# Patient Record
Sex: MALE | Race: WHITE | NOT HISPANIC OR LATINO | Employment: FULL TIME | URBAN - METROPOLITAN AREA
[De-identification: names, ages, dates, MRNs, and addresses within clinical notes are randomized per-mention and may not be internally consistent; named-entity substitution may affect disease eponyms.]

---

## 2020-08-05 ENCOUNTER — HISTORICAL (OUTPATIENT)
Dept: LAB | Facility: HOSPITAL | Age: 35
End: 2020-08-05

## 2020-08-05 LAB
ALBUMIN SERPL-MCNC: 3.8 GM/DL (ref 3.5–5)
ALBUMIN/GLOB SERPL: 1 RATIO (ref 1.1–2)
ALP SERPL-CCNC: 122 UNIT/L (ref 40–150)
ALT SERPL-CCNC: 33 UNIT/L (ref 0–55)
AST SERPL-CCNC: 17 UNIT/L (ref 5–34)
BILIRUB SERPL-MCNC: 0.4 MG/DL
BILIRUBIN DIRECT+TOT PNL SERPL-MCNC: 0.2 MG/DL
BILIRUBIN DIRECT+TOT PNL SERPL-MCNC: 0.2 MG/DL (ref 0–0.5)
BUN SERPL-MCNC: 16 MG/DL (ref 8.9–20.6)
CALCIUM SERPL-MCNC: 9.1 MG/DL (ref 8.4–10.2)
CHLORIDE SERPL-SCNC: 107 MMOL/L (ref 98–107)
CHOLEST SERPL-MCNC: 168 MG/DL
CHOLEST/HDLC SERPL: 4 {RATIO} (ref 0–5)
CO2 SERPL-SCNC: 26 MEQ/L (ref 22–29)
CREAT SERPL-MCNC: 0.96 MG/DL (ref 0.73–1.18)
EST. AVERAGE GLUCOSE BLD GHB EST-MCNC: 114 MG/DL
GLOBULIN SER-MCNC: 3.7 GM/DL (ref 2.4–3.5)
GLUCOSE SERPL-MCNC: 116 MG/DL (ref 74–100)
HBA1C MFR BLD: 5.6 % (ref 4–6)
HDLC SERPL-MCNC: 48 MG/DL (ref 35–60)
LDLC SERPL CALC-MCNC: 103 MG/DL (ref 50–140)
POTASSIUM SERPL-SCNC: 4.5 MMOL/L (ref 3.5–5.1)
PROT SERPL-MCNC: 7.5 GM/DL (ref 6.4–8.3)
SODIUM SERPL-SCNC: 140 MMOL/L (ref 136–145)
TRIGL SERPL-MCNC: 86 MG/DL (ref 34–140)
TSH SERPL-ACNC: 0.51 UIU/ML (ref 0.35–4.94)
VLDLC SERPL CALC-MCNC: 17 MG/DL

## 2022-11-08 DIAGNOSIS — F41.9 ANXIETY: ICD-10-CM

## 2022-11-08 DIAGNOSIS — G47.00 INSOMNIA, UNSPECIFIED TYPE: Primary | ICD-10-CM

## 2022-11-08 RX ORDER — SERTRALINE HYDROCHLORIDE 50 MG/1
50 TABLET, FILM COATED ORAL DAILY
COMMUNITY
End: 2022-11-08 | Stop reason: SDUPTHER

## 2022-11-08 RX ORDER — SERTRALINE HYDROCHLORIDE 50 MG/1
50 TABLET, FILM COATED ORAL DAILY
Qty: 30 TABLET | Refills: 0 | Status: SHIPPED | OUTPATIENT
Start: 2022-11-08 | End: 2022-11-22 | Stop reason: SDUPTHER

## 2022-11-08 RX ORDER — QUETIAPINE FUMARATE 50 MG/1
50 TABLET, FILM COATED ORAL NIGHTLY
Qty: 30 TABLET | Refills: 0 | Status: SHIPPED | OUTPATIENT
Start: 2022-11-08 | End: 2023-10-19

## 2022-11-08 RX ORDER — QUETIAPINE FUMARATE 50 MG/1
50 TABLET, FILM COATED ORAL NIGHTLY
COMMUNITY
End: 2022-11-08 | Stop reason: SDUPTHER

## 2022-11-22 DIAGNOSIS — F41.9 ANXIETY: ICD-10-CM

## 2022-11-22 RX ORDER — SERTRALINE HYDROCHLORIDE 50 MG/1
50 TABLET, FILM COATED ORAL DAILY
Qty: 30 TABLET | Refills: 0 | Status: SHIPPED | OUTPATIENT
Start: 2022-11-22 | End: 2023-10-19

## 2023-04-25 ENCOUNTER — HOSPITAL ENCOUNTER (EMERGENCY)
Facility: HOSPITAL | Age: 38
Discharge: HOME | End: 2023-04-25
Attending: EMERGENCY MEDICINE
Payer: MEDICAID

## 2023-04-25 VITALS
RESPIRATION RATE: 18 BRPM | BODY MASS INDEX: 34.3 KG/M2 | HEART RATE: 91 BPM | OXYGEN SATURATION: 98 % | SYSTOLIC BLOOD PRESSURE: 151 MMHG | HEIGHT: 71 IN | DIASTOLIC BLOOD PRESSURE: 86 MMHG | WEIGHT: 245 LBS | TEMPERATURE: 99 F

## 2023-04-25 DIAGNOSIS — S30.21XA CONTUSION OF PENIS, INITIAL ENCOUNTER: Primary | ICD-10-CM

## 2023-04-25 PROCEDURE — 99282 EMERGENCY DEPT VISIT SF MDM: CPT

## 2023-04-25 NOTE — ED PROVIDER NOTES
Encounter Date: 4/25/2023       History     Chief Complaint   Patient presents with    Groin Swelling     Penis swelling after getting caught in zipper. Penis is purple now.      Patient is a 37-year-old male presenting with complaint of bruise to his penis.  Patient states earlier today he caught his penis in his zipper and started having bruising to the glans penis.  Patient denies any other complaints.    Review of patient's allergies indicates:  No Known Allergies  Past Medical History:   Diagnosis Date    Anxiety     Cervical spondylosis     Mood disorder     Panic disorder (episodic paroxysmal anxiety)     Reactive hypoglycemia     Rectal pain     Spinal stenosis      Past Surgical History:   Procedure Laterality Date    CYST REMOVAL      eyebrow     Family History   Problem Relation Age of Onset    Leukemia Father      Social History     Tobacco Use    Smoking status: Never    Smokeless tobacco: Never   Substance Use Topics    Alcohol use: Yes    Drug use: Never     Review of Systems   Constitutional: Negative.    Respiratory: Negative.     Cardiovascular: Negative.    Gastrointestinal: Negative.    Genitourinary:  Positive for penile pain. Negative for difficulty urinating, dysuria, flank pain, frequency, genital sores, penile discharge, penile swelling, scrotal swelling and testicular pain.   Musculoskeletal: Negative.    Skin:  Positive for wound.   Neurological: Negative.      Physical Exam     Initial Vitals [04/25/23 1704]   BP Pulse Resp Temp SpO2   (!) 151/86 91 18 98.9 °F (37.2 °C) 98 %      MAP       --         Physical Exam    Nursing note and vitals reviewed.  Constitutional: He appears well-developed and well-nourished.   Cardiovascular:  Normal rate, regular rhythm and normal heart sounds.           Pulmonary/Chest: Breath sounds normal. He has no wheezes. He has no rhonchi.   Genitourinary:    Genitourinary Comments: Patient has small bruise about the size of a dime to the glans penis.  No  abrasion or laceration.  No swelling.  The area is mildly tender to palpation.       Neurological: He is alert and oriented to person, place, and time.   Skin:   No cellulitic changes.   Psychiatric: He has a normal mood and affect. His behavior is normal. Thought content normal.       ED Course   Procedures  Labs Reviewed - No data to display       Imaging Results    None          Medications - No data to display  Medical Decision Making:   Initial Assessment:   As per HPI  Differential Diagnosis:   Contusion, abrasion  ED Management:  Patient has a mild contusion to the penis, will discharge from the ER                        Clinical Impression:   Final diagnoses:  [S30.21XA] Contusion of penis, initial encounter (Primary)        ED Disposition Condition    Discharge Stable          ED Prescriptions    None       Follow-up Information    None          Rafael Phan MD  04/25/23 6815

## 2023-07-30 ENCOUNTER — HOSPITAL ENCOUNTER (EMERGENCY)
Facility: HOSPITAL | Age: 38
Discharge: HOME | End: 2023-07-30
Attending: PHYSICAL MEDICINE & REHABILITATION
Payer: MEDICAID

## 2023-07-30 VITALS
WEIGHT: 265 LBS | RESPIRATION RATE: 19 BRPM | DIASTOLIC BLOOD PRESSURE: 87 MMHG | BODY MASS INDEX: 37.1 KG/M2 | HEART RATE: 87 BPM | HEIGHT: 71 IN | OXYGEN SATURATION: 95 % | SYSTOLIC BLOOD PRESSURE: 127 MMHG | TEMPERATURE: 99 F

## 2023-07-30 DIAGNOSIS — R53.1 WEAKNESS: ICD-10-CM

## 2023-07-30 DIAGNOSIS — R04.0 EPISTAXIS: Primary | ICD-10-CM

## 2023-07-30 LAB
ALBUMIN SERPL-MCNC: 4 G/DL (ref 3.5–5)
ALBUMIN/GLOB SERPL: 1.1 RATIO (ref 1.1–2)
ALP SERPL-CCNC: 121 UNIT/L (ref 40–150)
ALT SERPL-CCNC: 21 UNIT/L (ref 0–55)
AST SERPL-CCNC: 14 UNIT/L (ref 5–34)
BASOPHILS # BLD AUTO: 0.03 X10(3)/MCL
BASOPHILS NFR BLD AUTO: 0.2 %
BILIRUBIN DIRECT+TOT PNL SERPL-MCNC: 0.2 MG/DL
BUN SERPL-MCNC: 11 MG/DL (ref 8.9–20.6)
CALCIUM SERPL-MCNC: 9 MG/DL (ref 8.4–10.2)
CHLORIDE SERPL-SCNC: 107 MMOL/L (ref 98–107)
CO2 SERPL-SCNC: 21 MMOL/L (ref 22–29)
CREAT SERPL-MCNC: 1.2 MG/DL (ref 0.73–1.18)
EOSINOPHIL # BLD AUTO: 0.02 X10(3)/MCL (ref 0–0.9)
EOSINOPHIL NFR BLD AUTO: 0.1 %
ERYTHROCYTE [DISTWIDTH] IN BLOOD BY AUTOMATED COUNT: 12.8 % (ref 11.5–17)
GFR SERPLBLD CREATININE-BSD FMLA CKD-EPI: >60 MLS/MIN/1.73/M2
GLOBULIN SER-MCNC: 3.6 GM/DL (ref 2.4–3.5)
GLUCOSE SERPL-MCNC: 166 MG/DL (ref 74–100)
HCT VFR BLD AUTO: 44.5 % (ref 42–52)
HGB BLD-MCNC: 15.1 G/DL (ref 14–18)
IMM GRANULOCYTES # BLD AUTO: 0.08 X10(3)/MCL (ref 0–0.04)
IMM GRANULOCYTES NFR BLD AUTO: 0.6 %
INR PPP: 1
LYMPHOCYTES # BLD AUTO: 1.25 X10(3)/MCL (ref 0.6–4.6)
LYMPHOCYTES NFR BLD AUTO: 8.7 %
MCH RBC QN AUTO: 29.5 PG (ref 27–31)
MCHC RBC AUTO-ENTMCNC: 33.9 G/DL (ref 33–36)
MCV RBC AUTO: 86.9 FL (ref 80–94)
MONOCYTES # BLD AUTO: 0.52 X10(3)/MCL (ref 0.1–1.3)
MONOCYTES NFR BLD AUTO: 3.6 %
NEUTROPHILS # BLD AUTO: 12.52 X10(3)/MCL (ref 2.1–9.2)
NEUTROPHILS NFR BLD AUTO: 86.8 %
NRBC BLD AUTO-RTO: 0 %
PLATELET # BLD AUTO: 298 X10(3)/MCL (ref 130–400)
PMV BLD AUTO: 10.4 FL (ref 7.4–10.4)
POTASSIUM SERPL-SCNC: 4.4 MMOL/L (ref 3.5–5.1)
PROT SERPL-MCNC: 7.6 GM/DL (ref 6.4–8.3)
PROTHROMBIN TIME: 9.9 SECONDS (ref 9.1–10.9)
RBC # BLD AUTO: 5.12 X10(6)/MCL (ref 4.7–6.1)
SODIUM SERPL-SCNC: 138 MMOL/L (ref 136–145)
WBC # SPEC AUTO: 14.42 X10(3)/MCL (ref 4.5–11.5)

## 2023-07-30 PROCEDURE — 80053 COMPREHEN METABOLIC PANEL: CPT | Performed by: PHYSICAL MEDICINE & REHABILITATION

## 2023-07-30 PROCEDURE — 85025 COMPLETE CBC W/AUTO DIFF WBC: CPT | Performed by: PHYSICAL MEDICINE & REHABILITATION

## 2023-07-30 PROCEDURE — 99285 EMERGENCY DEPT VISIT HI MDM: CPT | Mod: 25

## 2023-07-30 PROCEDURE — 85610 PROTHROMBIN TIME: CPT | Performed by: PHYSICAL MEDICINE & REHABILITATION

## 2023-07-30 PROCEDURE — 93005 ELECTROCARDIOGRAM TRACING: CPT

## 2023-07-30 NOTE — ED NOTES
"Pt to Ed with c/o R sided HA, R sided facial numbness,  pt heard a "pop" which a nose bleed followed. Denies injury. Reports recent weakness over past month with no sex drive. Arm strength, no deficits Sergio. Aaox4, gcs15  "

## 2023-07-31 NOTE — ED PROVIDER NOTES
"Encounter Date: 7/30/2023       History     Chief Complaint   Patient presents with    Headache    Epistaxis    Numbness    Dizziness    Weakness     Reports feeling a "pop" in the middle of forehead, then nose started bleeding, right sided facial numbness, weak and dizzy 2 hours PTA.       Chief Complaint  Patient presents with  · Headache  · Epistaxis  · Numbness  · Dizziness  · Weakness    Reports feeling a "pop" in the middle of forehead, then nose started bleeding, right sided facial numbness, weak and dizzy 2 hours PTA.  Patient states he was cooking and felt a pop in the frontal sinus region of the right side has felt a weird sensation in the sinus region of the right side of his face and did have a nosebleed which has resolved patient denies any trauma was cooking over a hot stove but was able to eat and has no other focal complaints      The history is provided by the patient.     Review of patient's allergies indicates:  No Known Allergies  Past Medical History:   Diagnosis Date    Anxiety     Cervical spondylosis     Mood disorder     Panic disorder (episodic paroxysmal anxiety)     Reactive hypoglycemia     Rectal pain     Spinal stenosis      Past Surgical History:   Procedure Laterality Date    CYST REMOVAL      eyebrow     Family History   Problem Relation Age of Onset    Leukemia Father      Social History     Tobacco Use    Smoking status: Never    Smokeless tobacco: Never   Substance Use Topics    Alcohol use: Yes    Drug use: Never     Review of Systems   Constitutional:  Negative for diaphoresis, fatigue and fever.   HENT:  Positive for sinus pressure. Negative for facial swelling.    Respiratory:  Negative for chest tightness.    Cardiovascular:  Negative for chest pain.   Neurological:  Positive for numbness (subjective mild right sided). Negative for facial asymmetry, weakness, light-headedness and headaches.   All other systems reviewed and are negative.      Physical Exam     Initial Vitals " [07/30/23 1738]   BP Pulse Resp Temp SpO2   (!) 152/98 97 20 98.8 °F (37.1 °C) 96 %      MAP       --         Physical Exam    Nursing note and vitals reviewed.  Constitutional: He appears well-developed and well-nourished.   HENT:   Head: Normocephalic and atraumatic.   Right Ear: External ear normal.   Left Ear: External ear normal.   Eyes: EOM are normal. Pupils are equal, round, and reactive to light.   Neck: Neck supple.   Normal range of motion.  Cardiovascular:  Normal rate, regular rhythm, normal heart sounds and intact distal pulses.           Pulmonary/Chest: Breath sounds normal.   Abdominal: Abdomen is soft. Bowel sounds are normal.   Musculoskeletal:         General: Normal range of motion.      Cervical back: Normal range of motion and neck supple.     Neurological: He is alert and oriented to person, place, and time. He has normal strength. A sensory deficit (Very mild subjective numbness at the right frontal sinus region and the right cheek states the area is slightly numb to touch there is no focal deficits there was no asymmetry noted) is present. No cranial nerve deficit. GCS score is 15. GCS eye subscore is 4. GCS verbal subscore is 5. GCS motor subscore is 6.   Skin: Skin is warm and dry.   Psychiatric: He has a normal mood and affect.         ED Course   Procedures  Labs Reviewed   COMPREHENSIVE METABOLIC PANEL - Abnormal; Notable for the following components:       Result Value    Carbon Dioxide 21 (*)     Glucose Level 166 (*)     Creatinine 1.20 (*)     Globulin 3.6 (*)     All other components within normal limits   CBC WITH DIFFERENTIAL - Abnormal; Notable for the following components:    WBC 14.42 (*)     Neut # 12.52 (*)     IG# 0.08 (*)     All other components within normal limits   PROTIME-INR - Normal   CBC W/ AUTO DIFFERENTIAL    Narrative:     The following orders were created for panel order CBC auto differential.  Procedure                               Abnormality         Status                      ---------                               -----------         ------                     CBC with Differential[[id number]]        Abnormal            Final result                 Please view results for these tests on the individual orders.     EKG Readings: (Independently Interpreted)   Initial Reading: No STEMI. Rhythm: Normal Sinus Rhythm. Heart Rate: 100. Ectopy: No Ectopy. Conduction: Normal. ST Segments: Normal ST Segments. T Waves: Normal. Clinical Impression: Normal Sinus Rhythm     ECG Results              EKG 12-lead (Final result)  Result time 07/30/23 19:44:20      Final result by Interface, Lab [facility] (07/30/23 19:44:20)                   Narrative:    Test Reason : R53.1,    Vent. Rate : 100 BPM     Atrial Rate : 100 BPM     P-R Int : 138 ms          QRS Dur : 088 ms      QT Int : 342 ms       P-R-T Axes : 048 027 016 degrees     QTc Int : 441 ms    Normal sinus rhythm  Normal ECG  No previous ECGs available  Confirmed by Ravindra Martinez MD ([id number]) on 7/30/2023 7:44:11 PM    Referred By: [email or web address]   SELF           Confirmed By:Ravindra Martinez MD                                  Imaging Results              CT Head Without Contrast (Preliminary result)  Result time 07/30/23 17:58:29      Preliminary result by Stephen De León MD (07/30/23 17:58:29)                   Narrative:    START OF REPORT:  Technique: CT of the head was performed without intravenous contrast with axial as well as coronal and sagittal images.    Comparison: None.    Dosage Information: Automated Exposure Control was utilized 829.99 mGy.cm.    Clinical history: Headache.    Findings:  CSF spaces: The ventricles sulci and basal cisterns are within normal limits.  Brain parenchyma: There is preservation of the grey white junction throughout. Subtle microvascular change is seen in portions of the periventricular and deep white matter tracts.  Cerebellum: Unremarkable.  Vascular: Unremarkable venous sinuses.  Sella and  "skull base: The sella appears to be within normal limits for age.  Cerebellopontine angles: Within normal limits.  Herniation: None.  Intracranial calcifications: Incidental note is made of bilateral choroid plexus calcification. Incidental note is made of some pineal region calcification. Incidental note is made of some calcification of the falx.  Calvarium: No acute linear or depressed skull fracture is seen.    Maxillofacial Structures:  Paranasal sinuses: The visualized paranasal sinuses appear clear with no mucoperiosteal thickening or air fluid levels identified.  Orbits: The orbits appear unremarkable.  Zygomatic arches: The zygomatic arches are intact and unremarkable.  Temporal bones and mastoids: The temporal bones and mastoids appear unremarkable.  TMJ: The mandibular condyles appear normally placed with respect to the mandibular fossa.  Nasal Bones: The nasal septum is midline.      Impression:  1. No acute intracranial process identified. Details and findings as noted above.                                      X-Rays:   Independently Interpreted Readings:   Other Readings:  CT scan noted to be unremarkable    Medications - No data to display  Medical Decision Making:   Initial Assessment:   Chief Complaint  Patient presents with  · Headache  · Epistaxis  · Numbness  · Dizziness  · Weakness    Reports feeling a "pop" in the middle of forehead, then nose started bleeding, right sided facial numbness, weak and dizzy 2 hours PTA.  Patient states he was cooking and felt a pop in the frontal sinus region of the right side has felt a weird sensation in the sinus region of the right side of his face and did have a nosebleed which has resolved patient denies any trauma was cooking over a hot stove but was able to eat and has no other focal complaint.  Differential Diagnosis:   Epistaxis, sinusitis, sinus pressure, neurologic event  Clinical Tests:   Lab Tests: Ordered and Reviewed  Radiological Study: Ordered " and Reviewed  ED Management:  Reviewed lab work and CT scan with the patient re-examined the patient there was no focal deficits there is mild subjective numbness and tingling which may be related to sinus pressure the numbness does not involve in the mouth or teeth and there is no asymmetry epistaxis has resolved patient is hemodynamically stable normotensive neurologically intact discussed with the patient that we will discharge home with close follow up understands he may return at any time and patient is comfortable with this patient is hemodynamically stable and discharged comfortable                          Clinical Impression:   Final diagnoses:  [R04.0] Epistaxis (Primary)  [R53.1] Weakness        ED Disposition Condition    Discharge Stable          ED Prescriptions    None       Follow-up Information       Follow up With Specialties Details Why Contact Info    Polo Yuan DO Family Medicine   [address] [facility] [address]  [phone]               Brandon Draper DO  07/30/23 2004

## 2023-09-13 DIAGNOSIS — N50.819 PAIN IN TESTICLE, UNSPECIFIED LATERALITY: Primary | ICD-10-CM

## 2023-09-21 ENCOUNTER — OFFICE VISIT (OUTPATIENT)
Dept: UROLOGY | Facility: CLINIC | Age: 38
End: 2023-09-21
Payer: MEDICAID

## 2023-09-21 VITALS
RESPIRATION RATE: 20 BRPM | BODY MASS INDEX: 38.67 KG/M2 | TEMPERATURE: 98 F | DIASTOLIC BLOOD PRESSURE: 77 MMHG | SYSTOLIC BLOOD PRESSURE: 111 MMHG | OXYGEN SATURATION: 98 % | HEIGHT: 71 IN | HEART RATE: 80 BPM | WEIGHT: 276.25 LBS

## 2023-09-21 DIAGNOSIS — N50.811 TESTICULAR PAIN, RIGHT: ICD-10-CM

## 2023-09-21 DIAGNOSIS — N50.819 PAIN IN TESTICLE, UNSPECIFIED LATERALITY: ICD-10-CM

## 2023-09-21 DIAGNOSIS — N40.1 BENIGN PROSTATIC HYPERPLASIA WITH WEAK URINARY STREAM: ICD-10-CM

## 2023-09-21 DIAGNOSIS — R10.9 ABDOMINAL PAIN, UNSPECIFIED ABDOMINAL LOCATION: ICD-10-CM

## 2023-09-21 DIAGNOSIS — R39.12 BENIGN PROSTATIC HYPERPLASIA WITH WEAK URINARY STREAM: ICD-10-CM

## 2023-09-21 DIAGNOSIS — N28.89 RENAL MASS: Primary | ICD-10-CM

## 2023-09-21 LAB
BILIRUB SERPL-MCNC: NEGATIVE MG/DL
BLOOD URINE, POC: NEGATIVE
COLOR, POC UA: NORMAL
GLUCOSE UR QL STRIP: NEGATIVE
KETONES UR QL STRIP: NEGATIVE
LEUKOCYTE ESTERASE URINE, POC: NEGATIVE
NITRITE, POC UA: NEGATIVE
PH, POC UA: 6.5
PROTEIN, POC: NEGATIVE
SPECIFIC GRAVITY, POC UA: 1.03
UROBILINOGEN, POC UA: 0.2

## 2023-09-21 PROCEDURE — 99214 OFFICE O/P EST MOD 30 MIN: CPT | Mod: PBBFAC | Performed by: NURSE PRACTITIONER

## 2023-09-21 PROCEDURE — 1159F PR MEDICATION LIST DOCUMENTED IN MEDICAL RECORD: ICD-10-PCS | Mod: CPTII,,, | Performed by: NURSE PRACTITIONER

## 2023-09-21 PROCEDURE — 99204 PR OFFICE/OUTPT VISIT, NEW, LEVL IV, 45-59 MIN: ICD-10-PCS | Mod: S$PBB,,, | Performed by: NURSE PRACTITIONER

## 2023-09-21 PROCEDURE — 81001 URINALYSIS AUTO W/SCOPE: CPT | Mod: PBBFAC | Performed by: NURSE PRACTITIONER

## 2023-09-21 PROCEDURE — 1160F RVW MEDS BY RX/DR IN RCRD: CPT | Mod: CPTII,,, | Performed by: NURSE PRACTITIONER

## 2023-09-21 PROCEDURE — 1159F MED LIST DOCD IN RCRD: CPT | Mod: CPTII,,, | Performed by: NURSE PRACTITIONER

## 2023-09-21 PROCEDURE — 3078F PR MOST RECENT DIASTOLIC BLOOD PRESSURE < 80 MM HG: ICD-10-PCS | Mod: CPTII,,, | Performed by: NURSE PRACTITIONER

## 2023-09-21 PROCEDURE — 3074F PR MOST RECENT SYSTOLIC BLOOD PRESSURE < 130 MM HG: ICD-10-PCS | Mod: CPTII,,, | Performed by: NURSE PRACTITIONER

## 2023-09-21 PROCEDURE — 3078F DIAST BP <80 MM HG: CPT | Mod: CPTII,,, | Performed by: NURSE PRACTITIONER

## 2023-09-21 PROCEDURE — 3008F BODY MASS INDEX DOCD: CPT | Mod: CPTII,,, | Performed by: NURSE PRACTITIONER

## 2023-09-21 PROCEDURE — 3074F SYST BP LT 130 MM HG: CPT | Mod: CPTII,,, | Performed by: NURSE PRACTITIONER

## 2023-09-21 PROCEDURE — 1160F PR REVIEW ALL MEDS BY PRESCRIBER/CLIN PHARMACIST DOCUMENTED: ICD-10-PCS | Mod: CPTII,,, | Performed by: NURSE PRACTITIONER

## 2023-09-21 PROCEDURE — 99204 OFFICE O/P NEW MOD 45 MIN: CPT | Mod: S$PBB,,, | Performed by: NURSE PRACTITIONER

## 2023-09-21 PROCEDURE — 3008F PR BODY MASS INDEX (BMI) DOCUMENTED: ICD-10-PCS | Mod: CPTII,,, | Performed by: NURSE PRACTITIONER

## 2023-09-21 RX ORDER — TAMSULOSIN HYDROCHLORIDE 0.4 MG/1
0.4 CAPSULE ORAL DAILY
Qty: 30 CAPSULE | Refills: 11 | Status: SHIPPED | OUTPATIENT
Start: 2023-09-21 | End: 2024-01-03

## 2023-09-21 NOTE — PROGRESS NOTES
Chief Complaint: No chief complaint on file.      HPI:  Patient is a 37-year-old male complaining of right testicular pain.Today patient presents bilateral intermittent testicular pain, rectal pain intermittently, testicular pain with intercourse, and weak urine stream.  Patient currently being treated with antibiotic for chlamydia.  Patient denies any dysuria, urinary urgency, frequency, incontinence, retention, gross hematuria, nocturia. Patient admits to family history of colorectal cancer but none urologically.  Patient was evaluated 6-7 years ago emergency room for rectal pain and it was determined he had a mass on the right lower aspect of his rectum any failed to be evaluated by Colorectal physician and follow-up with testing.      Allergies:  Review of patient's allergies indicates:  No Known Allergies    Medications:  Current Outpatient Medications   Medication Sig Dispense Refill    QUEtiapine (SEROQUEL) 50 MG tablet Take 1 tablet (50 mg total) by mouth every evening. 30 tablet 0    sertraline (ZOLOFT) 50 MG tablet Take 1 tablet (50 mg total) by mouth once daily. 30 tablet 0     No current facility-administered medications for this visit.       Review of Systems:  General: No fever, chills, fatigability, or weight loss.  Skin: No rashes, itching, or changes in color or texture of skin.  Chest: Denies WINCHESTER, cyanosis, wheezing, cough, and sputum production.  Abdomen: Appetite fine. No weight loss. Denies diarrhea, abdominal pain, hematemesis, or blood in stool.  Musculoskeletal: No joint stiffness or swelling. Denies back pain.  : As above.  All other review of systems negative.    PMH:  Past Medical History:   Diagnosis Date    Anxiety     Cervical spondylosis     Mood disorder     Panic disorder (episodic paroxysmal anxiety)     Reactive hypoglycemia     Rectal pain     Spinal stenosis        PSH:  Past Surgical History:   Procedure Laterality Date    CYST REMOVAL      eyebrow       FamHx:  Family History    Problem Relation Age of Onset    Leukemia Father        SocHx:  Social History     Socioeconomic History    Marital status: Single   Tobacco Use    Smoking status: Never    Smokeless tobacco: Never   Substance and Sexual Activity    Alcohol use: Yes    Drug use: Never    Sexual activity: Yes       Physical Exam:  There were no vitals filed for this visit.  General: A&Ox3, no apparent distress, no deformities  Neck: No masses, normal thyroid  Lungs: CTA sola, no use of accessory muscles  Heart: RRR, no arrhythmias  Abdomen: Soft, NT, ND, no masses, no hernias, no hepatosplenomegaly  Lymphatic: Neck and groin nodes negative  Skin: The skin is warm and dry. No jaundice.  Ext: No c/c/e.    GUMale: Test desc sola, no abnormalities of epididymus. Penis circumcised, with normal penile and scrotal skin. Meatus normal. Normal rectal tone, no hemorrhoids. Prostate:  2-1/2 finger breadth wide, 1/2 fingerbreadth thick smooth, soft, nontender, no nodules, a right lower rectal masses palpated and uncomfortable with palpation. SV not palpable. Perineum and anus normal.      Urinalysis:  Color, UA Dark Yellow    Spec Grav UA 1.030    pH, UA 6.5    WBC, UA Negative    Nitrite, UA Negative    Protein, POC Negative    Glucose, UA Negative    Ketones, UA Negative    Urobilinogen, UA 0.2    Bilirubin, POC Negative    Blood, UA Negative               Specimen Collected: 09/21/23 15:10         Microscopic urinalysis negative for WBCs, RBCs, nitrites.        Impression:  1. Pain in testicle, unspecified laterality  - Ambulatory referral/consult to Urology  2. BPH  3. Rectal mass    Plan: Instructed patient referred to gastroenterology for colonoscopy.  Instructed patient to start tamsulosin 0.4 mg p.o. daily.  Instructed patient for further testing of a CT of the abdomen pelvis with and without contrast to evaluate rectal mass.  Instructed patient on timed voiding every 2-3 hours, double voiding, avoidance of bladder irritants such as  alcohol citrus foods & drinks, chocolates, caffeinated drinks, energy drinks, spicy.  RTC 6 weeks.

## 2023-09-21 NOTE — LETTER
September 21, 2023      [facility]  [address] [address]  Phone: [phone]       Patient: Jeffrey Coulter   YOB: 1985  Date of Visit: 09/21/2023    To Whom It May Concern:    Emmett Coulter  was at [facility] on 09/21/2023. The patient may return to work/school on 09/22/2023. If you have any questions or concerns, or if I can be of further assistance, please do not hesitate to contact me.    Sincerely,    Vandana Doss LPN

## 2023-09-21 NOTE — PROGRESS NOTES
Placed in room. Seen by Andres Samaniego NP. Spoke with patient. CT of abdomen and pelvis. Referred for colonoscopy.  RTC in 6 weeks.

## 2023-10-05 ENCOUNTER — HOSPITAL ENCOUNTER (OUTPATIENT)
Dept: RADIOLOGY | Facility: HOSPITAL | Age: 38
Discharge: HOME | End: 2023-10-05
Attending: NURSE PRACTITIONER
Payer: MEDICAID

## 2023-10-05 DIAGNOSIS — R39.12 BENIGN PROSTATIC HYPERPLASIA WITH WEAK URINARY STREAM: ICD-10-CM

## 2023-10-05 DIAGNOSIS — N40.1 BENIGN PROSTATIC HYPERPLASIA WITH WEAK URINARY STREAM: ICD-10-CM

## 2023-10-05 PROCEDURE — 74176 CT ABD & PELVIS W/O CONTRAST: CPT | Mod: TC

## 2023-10-10 ENCOUNTER — OFFICE VISIT (OUTPATIENT)
Dept: UROLOGY | Facility: CLINIC | Age: 38
End: 2023-10-10
Payer: MEDICAID

## 2023-10-10 VITALS
DIASTOLIC BLOOD PRESSURE: 66 MMHG | HEIGHT: 70 IN | BODY MASS INDEX: 40.09 KG/M2 | HEART RATE: 69 BPM | SYSTOLIC BLOOD PRESSURE: 118 MMHG | RESPIRATION RATE: 16 BRPM | TEMPERATURE: 98 F | WEIGHT: 280 LBS

## 2023-10-10 DIAGNOSIS — K92.2 GASTROINTESTINAL HEMORRHAGE, UNSPECIFIED GASTROINTESTINAL HEMORRHAGE TYPE: ICD-10-CM

## 2023-10-10 DIAGNOSIS — N52.9 ERECTILE DYSFUNCTION, UNSPECIFIED ERECTILE DYSFUNCTION TYPE: ICD-10-CM

## 2023-10-10 DIAGNOSIS — K29.51 GASTROINTESTINAL HEMORRHAGE ASSOCIATED WITH CHRONIC GASTRITIS: ICD-10-CM

## 2023-10-10 DIAGNOSIS — R53.83 FATIGUE, UNSPECIFIED TYPE: Primary | ICD-10-CM

## 2023-10-10 PROCEDURE — 99214 OFFICE O/P EST MOD 30 MIN: CPT | Mod: PBBFAC | Performed by: NURSE PRACTITIONER

## 2023-10-10 PROCEDURE — 3078F DIAST BP <80 MM HG: CPT | Mod: CPTII,,, | Performed by: NURSE PRACTITIONER

## 2023-10-10 PROCEDURE — 1160F RVW MEDS BY RX/DR IN RCRD: CPT | Mod: CPTII,,, | Performed by: NURSE PRACTITIONER

## 2023-10-10 PROCEDURE — 1159F PR MEDICATION LIST DOCUMENTED IN MEDICAL RECORD: ICD-10-PCS | Mod: CPTII,,, | Performed by: NURSE PRACTITIONER

## 2023-10-10 PROCEDURE — 3008F PR BODY MASS INDEX (BMI) DOCUMENTED: ICD-10-PCS | Mod: CPTII,,, | Performed by: NURSE PRACTITIONER

## 2023-10-10 PROCEDURE — 3074F SYST BP LT 130 MM HG: CPT | Mod: CPTII,,, | Performed by: NURSE PRACTITIONER

## 2023-10-10 PROCEDURE — 1159F MED LIST DOCD IN RCRD: CPT | Mod: CPTII,,, | Performed by: NURSE PRACTITIONER

## 2023-10-10 PROCEDURE — 3074F PR MOST RECENT SYSTOLIC BLOOD PRESSURE < 130 MM HG: ICD-10-PCS | Mod: CPTII,,, | Performed by: NURSE PRACTITIONER

## 2023-10-10 PROCEDURE — 99213 OFFICE O/P EST LOW 20 MIN: CPT | Mod: S$PBB,,, | Performed by: NURSE PRACTITIONER

## 2023-10-10 PROCEDURE — 3078F PR MOST RECENT DIASTOLIC BLOOD PRESSURE < 80 MM HG: ICD-10-PCS | Mod: CPTII,,, | Performed by: NURSE PRACTITIONER

## 2023-10-10 PROCEDURE — 1160F PR REVIEW ALL MEDS BY PRESCRIBER/CLIN PHARMACIST DOCUMENTED: ICD-10-PCS | Mod: CPTII,,, | Performed by: NURSE PRACTITIONER

## 2023-10-10 PROCEDURE — 3008F BODY MASS INDEX DOCD: CPT | Mod: CPTII,,, | Performed by: NURSE PRACTITIONER

## 2023-10-10 PROCEDURE — 99213 PR OFFICE/OUTPT VISIT, EST, LEVL III, 20-29 MIN: ICD-10-PCS | Mod: S$PBB,,, | Performed by: NURSE PRACTITIONER

## 2023-10-10 RX ORDER — ASPIRIN 325 MG
TABLET, DELAYED RELEASE (ENTERIC COATED) ORAL
COMMUNITY
Start: 2023-09-28

## 2023-10-10 RX ORDER — SILDENAFIL CITRATE 20 MG/1
TABLET ORAL
COMMUNITY
Start: 2023-07-31 | End: 2024-01-03

## 2023-10-10 NOTE — LETTER
October 10, 2023      [facility]  [address] [address]  Phone: [phone]       Patient: Jeffrey Coulter   YOB: 1985  Date of Visit: 10/10/2023    To Whom It May Concern:    Emmett Coulter  was at [facility] on 10/10/2023. The patient may return to work/school on 10/10/2023. If you have any questions or concerns, or if I can be of further assistance, please do not hesitate to contact me.    Sincerely,    Vandana Doss LPN

## 2023-10-10 NOTE — PROGRESS NOTES
Chief Complaint: No chief complaint on file.      HPI: Patient is a 37-year-old male complaining of right testicular pain.Today patient presents bilateral intermittent testicular pain, rectal pain intermittently, testicular pain with intercourse, and weak urine stream.  On patient's last visit he was started on tamsulosin 0.4 mg p.o. daily and also sent to GI for colon evaluation.  Today's visit patient presents with persistent fatigue, unable to maintain an erection unless taking Viagra, persistent dark stools and also mixed bloody stools.  Patient has not been contacted by GI lab as of yet.  Patient also complaining of a lot of GI upset and gastritis.  Instructed patient to start Prilosec 40 mg p.o. daily until seen by GI.  We will run labs of CBC, CMP, estradiol, prolactin, PTH, thyroid, testosterone, PSA RTC 3 months unless positive for hypogonadism.  We will discuss with patient plan once labs completed.    Allergies:  Review of patient's allergies indicates:  No Known Allergies    Medications:  Current Outpatient Medications   Medication Sig Dispense Refill    cholecalciferol, vitamin D3, 1,250 mcg (50,000 unit) capsule       QUEtiapine (SEROQUEL) 50 MG tablet Take 1 tablet (50 mg total) by mouth every evening. 30 tablet 0    sertraline (ZOLOFT) 50 MG tablet Take 1 tablet (50 mg total) by mouth once daily. 30 tablet 0    sildenafil (REVATIO) 20 mg Tab SMARTSI-5 Tablet(s) By Mouth As Directed      tamsulosin (FLOMAX) 0.4 mg Cap Take 1 capsule (0.4 mg total) by mouth once daily. (Patient not taking: Reported on 10/10/2023) 30 capsule 11     No current facility-administered medications for this visit.       Review of Systems:  General: No fever, chills, fatigability, or weight loss.  Skin: No rashes, itching, or changes in color or texture of skin.  Chest: Denies WINCHESTER, cyanosis, wheezing, cough, and sputum production.  Abdomen: Appetite fine. No weight loss. Denies diarrhea, abdominal pain, hematemesis, or  blood in stool.  Musculoskeletal: No joint stiffness or swelling. Denies back pain.  : As above.  All other review of systems negative.    PMH:  Past Medical History:   Diagnosis Date    Anxiety     Cervical spondylosis     Mood disorder     Panic disorder (episodic paroxysmal anxiety)     Reactive hypoglycemia     Rectal pain     Spinal stenosis        PSH:  Past Surgical History:   Procedure Laterality Date    CYST REMOVAL      eyebrow       FamHx:  Family History   Problem Relation Age of Onset    Leukemia Father     Cancer Mother        SocHx:  Social History     Socioeconomic History    Marital status: Single   Tobacco Use    Smoking status: Never     Passive exposure: Never    Smokeless tobacco: Never   Substance and Sexual Activity    Alcohol use: Not Currently    Drug use: Never    Sexual activity: Yes     Partners: Female     Birth control/protection: None       Physical Exam:  Vitals:    10/10/23 0759   BP: 118/66   Pulse: 69   Resp: 16   Temp: 98.2 °F (36.8 °C)     General: A&Ox3, no apparent distress, no deformities  Neck: No masses, normal thyroid  Lungs: CTA sola, no use of accessory muscles  Heart: RRR, no arrhythmias  Abdomen: Soft, NT, ND, no masses, no hernias, no hepatosplenomegaly  Lymphatic: Neck and groin nodes negative  Skin: The skin is warm and dry. No jaundice.  Ext: No c/c/e.          Impression:  Fatigue  GI bleed  Erectile dysfunction      Plan:  Instructed patient will re-referred for GI for GI bleed.  Instructed patient start Prilosec 40 mg p.o. daily until seen by GI.  Instructed patient to get labs as discussed above, and will notify patient of results and treatment plan.  Instructed patient continue tamsulosin 0.4 mg p.o. daily RTC 3 months for re-evaluation.

## 2023-10-10 NOTE — PROGRESS NOTES
As per Zach Samaniego NP  RTC 3 months  Lab now-directions given building [address]  Re-Referral to GI clinic- phone number provided to check on appointment.

## 2023-10-19 ENCOUNTER — OFFICE VISIT (OUTPATIENT)
Dept: GASTROENTEROLOGY | Facility: CLINIC | Age: 38
End: 2023-10-19
Payer: MEDICAID

## 2023-10-19 VITALS
SYSTOLIC BLOOD PRESSURE: 136 MMHG | HEART RATE: 85 BPM | TEMPERATURE: 98 F | DIASTOLIC BLOOD PRESSURE: 87 MMHG | HEIGHT: 61 IN | OXYGEN SATURATION: 96 % | BODY MASS INDEX: 52.56 KG/M2 | WEIGHT: 278.38 LBS

## 2023-10-19 DIAGNOSIS — K62.89 RECTAL PAIN: Primary | ICD-10-CM

## 2023-10-19 DIAGNOSIS — K21.9 GASTROESOPHAGEAL REFLUX DISEASE, UNSPECIFIED WHETHER ESOPHAGITIS PRESENT: ICD-10-CM

## 2023-10-19 DIAGNOSIS — K59.09 CHRONIC CONSTIPATION: ICD-10-CM

## 2023-10-19 PROCEDURE — 3075F PR MOST RECENT SYSTOLIC BLOOD PRESS GE 130-139MM HG: ICD-10-PCS | Mod: CPTII,,, | Performed by: NURSE PRACTITIONER

## 2023-10-19 PROCEDURE — 1159F MED LIST DOCD IN RCRD: CPT | Mod: CPTII,,, | Performed by: NURSE PRACTITIONER

## 2023-10-19 PROCEDURE — 3079F DIAST BP 80-89 MM HG: CPT | Mod: CPTII,,, | Performed by: NURSE PRACTITIONER

## 2023-10-19 PROCEDURE — 1160F PR REVIEW ALL MEDS BY PRESCRIBER/CLIN PHARMACIST DOCUMENTED: ICD-10-PCS | Mod: CPTII,,, | Performed by: NURSE PRACTITIONER

## 2023-10-19 PROCEDURE — 1160F RVW MEDS BY RX/DR IN RCRD: CPT | Mod: CPTII,,, | Performed by: NURSE PRACTITIONER

## 2023-10-19 PROCEDURE — 99204 OFFICE O/P NEW MOD 45 MIN: CPT | Mod: S$PBB,,, | Performed by: NURSE PRACTITIONER

## 2023-10-19 PROCEDURE — 99214 OFFICE O/P EST MOD 30 MIN: CPT | Mod: PBBFAC | Performed by: NURSE PRACTITIONER

## 2023-10-19 PROCEDURE — 1159F PR MEDICATION LIST DOCUMENTED IN MEDICAL RECORD: ICD-10-PCS | Mod: CPTII,,, | Performed by: NURSE PRACTITIONER

## 2023-10-19 PROCEDURE — 3079F PR MOST RECENT DIASTOLIC BLOOD PRESSURE 80-89 MM HG: ICD-10-PCS | Mod: CPTII,,, | Performed by: NURSE PRACTITIONER

## 2023-10-19 PROCEDURE — 99204 PR OFFICE/OUTPT VISIT, NEW, LEVL IV, 45-59 MIN: ICD-10-PCS | Mod: S$PBB,,, | Performed by: NURSE PRACTITIONER

## 2023-10-19 PROCEDURE — 3008F PR BODY MASS INDEX (BMI) DOCUMENTED: ICD-10-PCS | Mod: CPTII,,, | Performed by: NURSE PRACTITIONER

## 2023-10-19 PROCEDURE — 3008F BODY MASS INDEX DOCD: CPT | Mod: CPTII,,, | Performed by: NURSE PRACTITIONER

## 2023-10-19 PROCEDURE — 3075F SYST BP GE 130 - 139MM HG: CPT | Mod: CPTII,,, | Performed by: NURSE PRACTITIONER

## 2023-10-19 RX ORDER — POLYETHYLENE GLYCOL 3350 17 G/17G
17 POWDER, FOR SOLUTION ORAL DAILY
Qty: 510 G | Refills: 5 | Status: SHIPPED | OUTPATIENT
Start: 2023-10-19 | End: 2023-10-19

## 2023-10-19 RX ORDER — POLYETHYLENE GLYCOL 3350, SODIUM SULFATE, SODIUM CHLORIDE, POTASSIUM CHLORIDE, SODIUM ASCORBATE, AND ASCORBIC ACID 7.5-2.691G
2000 KIT ORAL ONCE
Qty: 1 KIT | Refills: 0 | Status: SHIPPED | OUTPATIENT
Start: 2023-10-19 | End: 2023-10-19

## 2023-10-19 RX ORDER — POLYETHYLENE GLYCOL 3350 17 G/17G
17 POWDER, FOR SOLUTION ORAL DAILY
Qty: 510 G | Refills: 5 | Status: SHIPPED | OUTPATIENT
Start: 2023-10-19

## 2023-10-19 RX ORDER — PANTOPRAZOLE SODIUM 40 MG/1
40 TABLET, DELAYED RELEASE ORAL DAILY
Qty: 30 TABLET | Refills: 11 | Status: SHIPPED | OUTPATIENT
Start: 2023-10-19 | End: 2023-10-19

## 2023-10-19 RX ORDER — PANTOPRAZOLE SODIUM 40 MG/1
40 TABLET, DELAYED RELEASE ORAL DAILY
Qty: 30 TABLET | Refills: 11 | Status: SHIPPED | OUTPATIENT
Start: 2023-10-19 | End: 2024-03-28 | Stop reason: SDUPTHER

## 2023-10-19 NOTE — PROGRESS NOTES
"Subjective:       Patient ID: Jeffrey Coulter is a 37 y.o. male.    Chief Complaint: Rectal Pain (States he has been having pain in his rectum for quite some times. He states it is painful to have a BM and he saw a urologist and had his prostate checked but the Dr he saw said he felt something when he was pulling out from checking his prostate like a possible growth. States he feels pressure and cramping inside his rectum. ) and Gastroesophageal Reflux (States he has been having heartburn for a few weeks that comes and goes. Sometime he feels like he could spit fire. Denies any trouble swallowing. )    This 37-year-old  male with a history of anxiety and mood disorder is referred for rectal pain.  He presents unaccompanied.  He reports noticing a decreased sex drive over the past few months and difficulty maintaining an erection during intercourse.  He reports right-sided testicular pain noted with ejaculation.  He was started on Viagra shortly after onset of symptoms which has helped with his erection.  He had a testicular ultrasound done September 12, 2023 with findings of small bilateral hydroceles, left greater than right and no varicocele.  He was recommended to wear boxer briefs.  He saw Urology September 21, 2023 and recommended to start tamsulosin which he did not start.  He reports being able to initiate a stream but having difficulty maintaining a stream during urination.  He notices dribbling at the end of urination.  He describes pain in his rectum "higher up" that has been intermittent and fluctuating in intensity over the past 7 years.  The symptoms initially started while taking a tire off of a truck.  The pain is described as sharp and crampy and will frequently awaken him in the middle of the night.  The pain will relieve for a short period of time with defecation before reoccurring.  Bowel habits for the most part are described as formed stools.  He does not always feel completely " evacuated.  He denies melena, hematochezia, fecal urgency, fecal incontinence, or pain with defecation.  His appetite is fair and his weight is stable.  He denies fever, chills, nausea, vomiting, hematemesis, odynophagia, dysphagia, early satiety, or abdominal pain.  He reports frequent pyrosis and regurgitation of acid into the back of his throat with subsequent burning for the past several months.  He takes Tums most days of the week with some relief noted.  He has tried OTC PPI medications and H2 blockers, as well as antacids with some relief noted.  He has not taken the medication consistently or been prescribed a PPI medication in the past.    CT scan abdomen and pelvis without contrast October 5, 2023 revealed no evidence of nephrolithiasis, hydronephrosis, or hydroureter is visualized.  There is grossly symmetric bilateral perinephric stranding which is nonspecific, possibly related to medical-renal disease.  Please correlate with patient's clinical and laboratory findings.  Mild-to-moderate retained fecal material and gas scattered within the colon is seen.  Please correlate for an element of constipation.  Additional findings and details as above.     He denies ever having an EGD or colonoscopy done.  He denies regular NSAID use or use of blood thinners.  He denies tobacco or alcohol use.  He denies illicit drug use.  He denies a family history of IBD, colon polyps, or colon cancer.    Review of patient's allergies indicates:  No Known Allergies    Past Medical History:   Diagnosis Date    Anxiety     Cervical spondylosis     Mood disorder     Panic disorder (episodic paroxysmal anxiety)     Reactive hypoglycemia     Rectal pain     Spinal stenosis      Past Surgical History:   Procedure Laterality Date    CYST REMOVAL      eyebrow     Family History:   family history includes Cancer in his mother; Leukemia in his father.    Social History:    reports that he has never smoked. He has never been exposed to  tobacco smoke. He has never used smokeless tobacco. He reports that he does not currently use alcohol. He reports that he does not use drugs.    Review of Systems  Negative except as noted in the HPI.      Objective:      Physical Exam  Constitutional:       Appearance: Normal appearance.   HENT:      Head: Normocephalic.      Mouth/Throat:      Mouth: Mucous membranes are moist.   Eyes:      Extraocular Movements: Extraocular movements intact.      Conjunctiva/sclera: Conjunctivae normal.      Pupils: Pupils are equal, round, and reactive to light.   Cardiovascular:      Rate and Rhythm: Normal rate and regular rhythm.      Pulses: Normal pulses.      Heart sounds: Normal heart sounds.   Pulmonary:      Effort: Pulmonary effort is normal.      Breath sounds: Normal breath sounds.   Abdominal:      General: Bowel sounds are normal.      Palpations: Abdomen is soft.   Genitourinary:     Comments: Rectal exam completed and chaperoned, no external hemorrhoids or skin tags noted on exam, mild tenderness noted with MORENA, no masses, fissures, or internal hemorrhoids palpated, hard stool noted in rectal vault  Musculoskeletal:         General: Normal range of motion.      Cervical back: Normal range of motion and neck supple.   Skin:     General: Skin is warm and dry.   Neurological:      General: No focal deficit present.      Mental Status: He is alert and oriented to person, place, and time.   Psychiatric:         Mood and Affect: Mood normal.         Behavior: Behavior normal.         Thought Content: Thought content normal.         Judgment: Judgment normal.         Home Medications:     Current Outpatient Medications   Medication Sig    cholecalciferol, vitamin D3, 1,250 mcg (50,000 unit) capsule     QUEtiapine (SEROQUEL) 50 MG tablet Take 1 tablet (50 mg total) by mouth every evening.    sertraline (ZOLOFT) 50 MG tablet Take 1 tablet (50 mg total) by mouth once daily.    sildenafil (REVATIO) 20 mg Tab SMARTSI-5  Tablet(s) By Mouth As Directed    tamsulosin (FLOMAX) 0.4 mg Cap Take 1 capsule (0.4 mg total) by mouth once daily. (Patient not taking: Reported on 10/10/2023)     Laboratory Results:     Recent Results (from the past 2016 hour(s))   Comprehensive metabolic panel    Collection Time: 07/30/23  5:47 PM   Result Value Ref Range    Sodium Level 138 136 - 145 mmol/L    Potassium Level 4.4 3.5 - 5.1 mmol/L    Chloride 107 98 - 107 mmol/L    Carbon Dioxide 21 (L) 22 - 29 mmol/L    Glucose Level 166 (H) 74 - 100 mg/dL    Blood Urea Nitrogen 11.0 8.9 - 20.6 mg/dL    Creatinine 1.20 (H) 0.73 - 1.18 mg/dL    Calcium Level Total 9.0 8.4 - 10.2 mg/dL    Protein Total 7.6 6.4 - 8.3 gm/dL    Albumin Level 4.0 3.5 - 5.0 g/dL    Globulin 3.6 (H) 2.4 - 3.5 gm/dL    Albumin/Globulin Ratio 1.1 1.1 - 2.0 ratio    Bilirubin Total 0.2 <=1.5 mg/dL    Alkaline Phosphatase 121 40 - 150 unit/L    Alanine Aminotransferase 21 0 - 55 unit/L    Aspartate Aminotransferase 14 5 - 34 unit/L    eGFR >60 mls/min/1.73/m2   Protime-INR    Collection Time: 07/30/23  5:47 PM   Result Value Ref Range    PT 9.9 9.1 - 10.9 seconds    INR 1.0 <=1.3   CBC with Differential    Collection Time: 07/30/23  5:47 PM   Result Value Ref Range    WBC 14.42 (H) 4.50 - 11.50 x10(3)/mcL    RBC 5.12 4.70 - 6.10 x10(6)/mcL    Hgb 15.1 14.0 - 18.0 g/dL    Hct 44.5 42.0 - 52.0 %    MCV 86.9 80.0 - 94.0 fL    MCH 29.5 27.0 - 31.0 pg    MCHC 33.9 33.0 - 36.0 g/dL    RDW 12.8 11.5 - 17.0 %    Platelet 298 130 - 400 x10(3)/mcL    MPV 10.4 7.4 - 10.4 fL    Neut % 86.8 %    Lymph % 8.7 %    Mono % 3.6 %    Eos % 0.1 %    Basophil % 0.2 %    Lymph # 1.25 0.6 - 4.6 x10(3)/mcL    Neut # 12.52 (H) 2.1 - 9.2 x10(3)/mcL    Mono # 0.52 0.1 - 1.3 x10(3)/mcL    Eos # 0.02 0 - 0.9 x10(3)/mcL    Baso # 0.03 <=0.2 x10(3)/mcL    IG# 0.08 (H) 0 - 0.04 x10(3)/mcL    IG% 0.6 %    NRBC% 0.0 %   POCT URINE DIPSTICK WITH MICROSCOPE, AUTOMATED    Collection Time: 09/21/23  3:10 PM   Result Value Ref  Range    Color, UA Dark Yellow     Spec Grav UA 1.030     pH, UA 6.5     WBC, UA Negative     Nitrite, UA Negative     Protein, POC Negative     Glucose, UA Negative     Ketones, UA Negative     Urobilinogen, UA 0.2     Bilirubin, POC Negative     Blood, UA Negative    PSA, Total (Diagnostic)    Collection Time: 10/10/23  9:51 AM   Result Value Ref Range    Prostate Specific Antigen 1.32 <=4.00 ng/mL   Testosterone    Collection Time: 10/10/23  9:51 AM   Result Value Ref Range    Testosterone Total 398.14 240.24 - 870.68 ng/dL   Prolactin    Collection Time: 10/10/23  9:51 AM   Result Value Ref Range    Prolactin Level 7.09 3.46 - 19.40 ng/mL   Estradiol    Collection Time: 10/10/23  9:51 AM   Result Value Ref Range    Estradiol Level 42 pg/mL   TSH    Collection Time: 10/10/23  9:51 AM   Result Value Ref Range    TSH 0.565 0.350 - 4.940 uIU/mL     Imaging Results:     Narrative & Impression  EXAMINATION:  CT ABDOMEN PELVIS WITHOUT CONTRAST     CLINICAL HISTORY:  Flank pain, kidney stone suspected;Renal mass suspected (Ped 0-18y); Benign prostatic hyperplasia with lower urinary tract symptoms.     TECHNIQUE:  Axial CT slice through the abdomen and pelvis were obtained without the administration of intravenous contrast. Total DLP for the study is approximately 1748.6 mGy-cm. Automated exposure control was utilized.     COMPARISON:  CT abdomen pelvis dated 07/11/2016     FINDINGS:  The visualized lung bases appear to be grossly clear.  The visualized cardiac bases within normal limits for size.  The assessment of the intra-abdominal organs and bowel loops is limited in the absence of contrast.  The the noncontrast appearance of the liver, spleen, partially atrophic and fatty replaced pancreas, and adrenal glands appear to be within normal limits.  The gallbladder is somewhat contracted.  No evidence of calcified gallbladder calculi or pericholecystic fluid is apparent by CT.  No evidence of nephrolithiasis,  hydronephrosis, or hydroureter is appreciated.  There is grossly symmetric bilateral perinephric stranding which is nonspecific, possibly related to medical-renal disease.  The urinary bladder is underdistended which limits adequate assessment along with lack of contrast.     Portions of the small and large bowel are underdistended which along with lack of contrast limits assessment.  Mild-to-moderate retained fecal material and gas scattered within the colon is seen.  There is intramural fat attenuation within the ascending and proximal transverse colon which can be seen with chronic inflammatory processes.  The appendix appears to be normal in caliber.  No free air or free fluid is visualized.  The stomach is underdistended which limits assessment.  Scattered subcentimeter in short axis dimension mesenteric and retroperitoneal lymph nodes are noted.  Small fat containing umbilical hernia is seen.  Degenerative changes affect the visualized spine.  Chronic appearing bilateral L5 pars interarticularis defects are seen with minimal 2 mm anterolisthesis of L5 on S1.     Impression:     1. No evidence of nephrolithiasis, hydronephrosis, or hydroureter is visualized.  There is grossly symmetric bilateral perinephric stranding which is nonspecific, possibly related to medical-renal disease.  Please correlate with patient's clinical and laboratory findings.  2. Mild-to-moderate retained fecal material and gas scattered within the colon is seen.  Please correlate for an element of constipation.  3. Additional findings and details as above.      Electronically signed by: Soren Fox MD  Date:                                            10/05/2023  Time:                                           10:24    Assessment/Plan:     Problem List Items Addressed This Visit          GI    Rectal pain - Primary     Testicular ultrasound done September 12, 2023 with findings of small bilateral hydroceles, left greater than right and no  varicocele.  Followed by urology  CT scan abdomen and pelvis without contrast October 5, 2023 revealed no evidence of nephrolithiasis, hydronephrosis, or hydroureter is visualized.  There is grossly symmetric bilateral perinephric stranding which is nonspecific, possibly related to medical-renal disease.  Please correlate with patient's clinical and laboratory findings.  Mild-to-moderate retained fecal material and gas scattered within the colon is seen.  Please correlate for an element of constipation.  Additional findings and details as above.   Recommend soluble fiber supplementation  Avoid straining or sitting on the toilet for long periods of time  Recommend MiraLax 17 g daily  Colonoscopy   Call with updates   ER precautions provided   Follow-up clinic visit with NP in 3 months         Relevant Medications    polyethylene glycol (GLYCOLAX) 17 gram/dose powder    Other Relevant Orders    Case Request Endoscopy: COLONOSCOPY (Completed)    Chronic constipation     See above         Relevant Medications    polyethylene glycol (GLYCOLAX) 17 gram/dose powder    Other Relevant Orders    Case Request Endoscopy: COLONOSCOPY (Completed)    Gastroesophageal reflux disease     GERD lifestyle modifications  Reflux precautions  Avoid NSAID use  Avoid alcohol use   Start pantoprazole 40 mg daily  Will consider EGD with persistent or worsening symptoms despite PPI treatment         Relevant Medications    pantoprazole (PROTONIX) 40 MG tablet

## 2023-10-19 NOTE — ASSESSMENT & PLAN NOTE
GERD lifestyle modifications  Reflux precautions  Avoid NSAID use  Avoid alcohol use   Start pantoprazole 40 mg daily  Will consider EGD with persistent or worsening symptoms despite PPI treatment

## 2023-10-19 NOTE — LETTER
October 19, 2023      [facility]  [address] [facility] [address]  Phone: [phone]       Patient: Jeffrey Coulter   YOB: 1985  Date of Visit: 10/19/2023    To Whom It May Concern:    Emmett Coulter  was at [facility] on 10/19/2023. The patient may return to work/school on 10/20/2023 with no restrictions. If you have any questions or concerns, or if I can be of further assistance, please do not hesitate to contact me.    Sincerely,    Claudia Lucas LPN

## 2023-10-19 NOTE — ASSESSMENT & PLAN NOTE
Testicular ultrasound done September 12, 2023 with findings of small bilateral hydroceles, left greater than right and no varicocele.  Followed by urology  CT scan abdomen and pelvis without contrast October 5, 2023 revealed no evidence of nephrolithiasis, hydronephrosis, or hydroureter is visualized.  There is grossly symmetric bilateral perinephric stranding which is nonspecific, possibly related to medical-renal disease.  Please correlate with patient's clinical and laboratory findings.  Mild-to-moderate retained fecal material and gas scattered within the colon is seen.  Please correlate for an element of constipation.  Additional findings and details as above.   Recommend soluble fiber supplementation  Avoid straining or sitting on the toilet for long periods of time  Recommend MiraLax 17 g daily  Colonoscopy   Call with updates   ER precautions provided   Follow-up clinic visit with NP in 3 months

## 2023-10-26 ENCOUNTER — ANESTHESIA EVENT (OUTPATIENT)
Dept: ENDOSCOPY | Facility: HOSPITAL | Age: 38
End: 2023-10-26
Payer: MEDICAID

## 2023-10-26 NOTE — ANESTHESIA PREPROCEDURE EVALUATION
"                                                                                                             10/26/2023  Jeffrey Coulter is a 38 y.o., male. With PMHx of obesity, GERD, anxiety presents for COLONOSCOPY (Abdomen)    NO BETA BLOCKER USE    history of anxiety and mood disorder     Active Ambulatory Problems     Diagnosis Date Noted    Testicular pain, right 09/21/2023    Benign prostatic hyperplasia with weak urinary stream 09/21/2023    Gastrointestinal hemorrhage associated with chronic gastritis 10/10/2023    Rectal pain 10/19/2023    Chronic constipation 10/19/2023    Gastroesophageal reflux disease 10/19/2023     Resolved Ambulatory Problems     Diagnosis Date Noted    No Resolved Ambulatory Problems     Past Medical History:   Diagnosis Date    Anxiety     Cervical spondylosis     Mood disorder     Panic disorder (episodic paroxysmal anxiety)     Reactive hypoglycemia     Spinal stenosis        Past Surgical History:   Procedure Laterality Date    CYST REMOVAL      eyebrow                 Pre-op Assessment    I have reviewed the NPO Status.      Review of Systems  Anesthesia Hx:  No previous Anesthesia    Social:  Non-Smoker    Pulmonary:  Pulmonary Normal    Renal/:  Renal/ Normal     Hepatic/GI:   Bowel Prep. GERD, well controlled    Neurological:  Neurology Normal    Endocrine:  Obesity / BMI > 30  Psych:   anxiety        Vitals:    10/30/23 0923 10/30/23 0927   BP: (!) 151/95 (!) 151/95   BP Location: Left arm    Patient Position: Lying    Pulse: 67    Resp: (!) 22    Temp: 36.8 °C (98.2 °F)    TempSrc: Oral    SpO2: 97%    Weight: 124.7 kg (275 lb)    Height: 5' 11" (1.803 m)          Physical Exam  General: Alert, Cooperative and Well nourished    Airway:  Mallampati: II   Mouth Opening: Normal  TM Distance: Normal  Tongue: Normal  Neck ROM: Normal ROM    Dental:  Intact    Chest/Lungs:  Clear to auscultation, Normal Respiratory Rate    Heart:  Rate: Normal  Rhythm: " Regular Rhythm  Sounds: Normal      Lab Results   Component Value Date    WBC 14.42 (H) 07/30/2023    HGB 15.1 07/30/2023    HCT 44.5 07/30/2023    MCV 86.9 07/30/2023     07/30/2023       CMP  Sodium Level   Date Value Ref Range Status   07/30/2023 138 136 - 145 mmol/L Final     Potassium Level   Date Value Ref Range Status   07/30/2023 4.4 3.5 - 5.1 mmol/L Final     Carbon Dioxide   Date Value Ref Range Status   07/30/2023 21 (L) 22 - 29 mmol/L Final     Blood Urea Nitrogen   Date Value Ref Range Status   07/30/2023 11.0 8.9 - 20.6 mg/dL Final     Creatinine   Date Value Ref Range Status   07/30/2023 1.20 (H) 0.73 - 1.18 mg/dL Final     Calcium Level Total   Date Value Ref Range Status   07/30/2023 9.0 8.4 - 10.2 mg/dL Final     Albumin Level   Date Value Ref Range Status   07/30/2023 4.0 3.5 - 5.0 g/dL Final     Bilirubin Total   Date Value Ref Range Status   07/30/2023 0.2 <=1.5 mg/dL Final     Alkaline Phosphatase   Date Value Ref Range Status   07/30/2023 121 40 - 150 unit/L Final     Aspartate Aminotransferase   Date Value Ref Range Status   07/30/2023 14 5 - 34 unit/L Final     Alanine Aminotransferase   Date Value Ref Range Status   07/30/2023 21 0 - 55 unit/L Final     eGFR   Date Value Ref Range Status   07/30/2023 >60 mls/min/1.73/m2 Final           Anesthesia Plan  Type of Anesthesia, risks & benefits discussed:    Anesthesia Type: Gen Natural Airway  Intra-op Monitoring Plan: Standard ASA Monitors  Post Op Pain Control Plan: IV/PO Opioids PRN  Induction:  IV  Informed Consent: Informed consent signed with the Patient and all parties understand the risks and agree with anesthesia plan.  All questions answered. Patient consented to blood products? No  ASA Score: 2  Day of Surgery Review of History & Physical: H&P Update referred to the surgeon/provider.    Ready For Surgery From Anesthesia Perspective.     .

## 2023-10-30 ENCOUNTER — ANESTHESIA (OUTPATIENT)
Dept: ENDOSCOPY | Facility: HOSPITAL | Age: 38
End: 2023-10-30
Payer: MEDICAID

## 2023-10-30 ENCOUNTER — HOSPITAL ENCOUNTER (OUTPATIENT)
Facility: HOSPITAL | Age: 38
Discharge: HOME | End: 2023-10-30
Attending: INTERNAL MEDICINE | Admitting: INTERNAL MEDICINE
Payer: MEDICAID

## 2023-10-30 DIAGNOSIS — K59.09 CHRONIC CONSTIPATION: ICD-10-CM

## 2023-10-30 DIAGNOSIS — K62.89 RECTAL PAIN: ICD-10-CM

## 2023-10-30 LAB — POCT GLUCOSE: 92 MG/DL (ref 70–110)

## 2023-10-30 PROCEDURE — 63600175 PHARM REV CODE 636 W HCPCS: Performed by: SPECIALIST

## 2023-10-30 PROCEDURE — 25000003 PHARM REV CODE 250: Performed by: NURSE ANESTHETIST, CERTIFIED REGISTERED

## 2023-10-30 PROCEDURE — D9220A PRA ANESTHESIA: ICD-10-PCS | Mod: ,,, | Performed by: NURSE ANESTHETIST, CERTIFIED REGISTERED

## 2023-10-30 PROCEDURE — 45385 COLONOSCOPY W/LESION REMOVAL: CPT | Performed by: INTERNAL MEDICINE

## 2023-10-30 PROCEDURE — 27201423 OPTIME MED/SURG SUP & DEVICES STERILE SUPPLY: Performed by: INTERNAL MEDICINE

## 2023-10-30 PROCEDURE — 37000009 HC ANESTHESIA EA ADD 15 MINS: Performed by: INTERNAL MEDICINE

## 2023-10-30 PROCEDURE — 88305 TISSUE EXAM BY PATHOLOGIST: CPT | Mod: TC | Performed by: INTERNAL MEDICINE

## 2023-10-30 PROCEDURE — D9220A PRA ANESTHESIA: Mod: ,,, | Performed by: NURSE ANESTHETIST, CERTIFIED REGISTERED

## 2023-10-30 PROCEDURE — 37000008 HC ANESTHESIA 1ST 15 MINUTES: Performed by: INTERNAL MEDICINE

## 2023-10-30 PROCEDURE — 63600175 PHARM REV CODE 636 W HCPCS: Performed by: NURSE ANESTHETIST, CERTIFIED REGISTERED

## 2023-10-30 RX ORDER — LIDOCAINE HYDROCHLORIDE 20 MG/ML
INJECTION, SOLUTION EPIDURAL; INFILTRATION; INTRACAUDAL; PERINEURAL
Status: DISCONTINUED | OUTPATIENT
Start: 2023-10-30 | End: 2023-10-30

## 2023-10-30 RX ORDER — SODIUM CHLORIDE, SODIUM LACTATE, POTASSIUM CHLORIDE, CALCIUM CHLORIDE 600; 310; 30; 20 MG/100ML; MG/100ML; MG/100ML; MG/100ML
INJECTION, SOLUTION INTRAVENOUS CONTINUOUS
Status: ACTIVE | OUTPATIENT
Start: 2023-10-30

## 2023-10-30 RX ORDER — PROPOFOL 10 MG/ML
VIAL (ML) INTRAVENOUS
Status: DISCONTINUED | OUTPATIENT
Start: 2023-10-30 | End: 2023-10-30

## 2023-10-30 RX ADMIN — PROPOFOL 50 MG: 10 INJECTION, EMULSION INTRAVENOUS at 10:10

## 2023-10-30 RX ADMIN — PROPOFOL 120 MG: 10 INJECTION, EMULSION INTRAVENOUS at 10:10

## 2023-10-30 RX ADMIN — SODIUM CHLORIDE, POTASSIUM CHLORIDE, SODIUM LACTATE AND CALCIUM CHLORIDE: 600; 310; 30; 20 INJECTION, SOLUTION INTRAVENOUS at 09:10

## 2023-10-30 RX ADMIN — LIDOCAINE HYDROCHLORIDE 100 MG: 20 INJECTION, SOLUTION EPIDURAL; INFILTRATION; INTRACAUDAL; PERINEURAL at 10:10

## 2023-10-30 RX ADMIN — SODIUM CHLORIDE, POTASSIUM CHLORIDE, SODIUM LACTATE AND CALCIUM CHLORIDE: 600; 310; 30; 20 INJECTION, SOLUTION INTRAVENOUS at 10:10

## 2023-10-30 NOTE — TRANSFER OF CARE
"Anesthesia Transfer of Care Note    Patient: Jeffrey Coulter    Procedure(s) Performed: Procedure(s) (LRB):  COLONOSCOPY, WITH POLYPECTOMY USING SNARE (N/A)    Patient location: GI    Anesthesia Type: general    Post pain: adequate analgesia    Post assessment: no apparent anesthetic complications    Post vital signs: stable    Level of consciousness: awake    Nausea/Vomiting: no nausea/vomiting    Complications: none    Transfer of care protocol was followed      Last vitals:   Visit Vitals  BP (!) 151/95   Pulse 67   Temp 36.8 °C (98.2 °F) (Oral)   Resp (!) 22   Ht 5' 11" (1.803 m)   Wt 124.7 kg (275 lb)   SpO2 97%   BMI 38.35 kg/m²     "

## 2023-10-30 NOTE — ANESTHESIA POSTPROCEDURE EVALUATION
Anesthesia Post Evaluation    Patient: Jeffrey Coulter    Procedure(s) Performed: Procedure(s) (LRB):  COLONOSCOPY, WITH POLYPECTOMY USING SNARE (N/A)    Final Anesthesia Type: general      Patient location during evaluation: GI PACU  Patient participation: Yes- Able to Participate  Level of consciousness: awake and alert  Post-procedure vital signs: reviewed and stable  Pain management: adequate  Airway patency: patent    PONV status at discharge: No PONV  Anesthetic complications: no      Cardiovascular status: hemodynamically stable  Respiratory status: spontaneous ventilation  Hydration status: euvolemic  Follow-up not needed.          Vitals Value Taken Time   /95 10/30/23 0927   Temp 36.8 °C (98.2 °F) 10/30/23 0923   Pulse 67 10/30/23 0923   Resp 22 10/30/23 0923   SpO2 97 % 10/30/23 0923         No case tracking events are documented in the log.      Pain/Nallely Score: No data recorded

## 2023-10-30 NOTE — PROVATION PATIENT INSTRUCTIONS
Discharge Summary/Instructions after an Endoscopic Procedure  Patient Name: Jeffrey Coulter  Patient MRN: [id number]  Patient YOB: 1985  Monday, October 30, 2023  Bina Sparks MD  Dear patient,  As a result of recent federal legislation (The Federal Cures Act), you may   receive lab or pathology results from your procedure in your [facility]   account before your physician is able to contact you. Your physician or   their representative will relay the results to you with their   recommendations at their soonest availability.  Thank you,  RESTRICTIONS:  During your procedure today, you received medications for sedation.  These   medications may affect your judgment, balance and coordination.  Therefore,   for 24 hours, you have the following restrictions:   - DO NOT drive a car, operate machinery, make legal/financial decisions,   sign important papers or drink alcohol.    ACTIVITY:  Today: no heavy lifting, straining or running due to procedural   sedation/anesthesia.  The following day: return to full activity including work.  DIET:  Eat and drink normally unless instructed otherwise.     TREATMENT FOR COMMON SIDE EFFECTS:  - Mild abdominal pain, nausea, belching, bloating or excessive gas:  rest,   eat lightly and use a heating pad.  - Sore Throat: treat with throat lozenges and/or gargle with warm salt   water.  - Because air was used during the procedure, expelling large amounts of air   from your rectum or belching is normal.  - If a bowel prep was taken, you may not have a bowel movement for 1-3 days.    This is normal.  SYMPTOMS TO WATCH FOR AND REPORT TO YOUR PHYSICIAN:  1. Abdominal pain or bloating, other than gas cramps.  2. Chest pain.  3. Back pain.  4. Signs of infection such as: chills or fever occurring within 24 hours   after the procedure.  5. Rectal bleeding, which would show as bright red, maroon, or black stools.   (A tablespoon of blood from the rectum is not serious,  especially if   hemorrhoids are present.)  6. Vomiting.  7. Weakness or dizziness.  GO DIRECTLY TO THE NEAREST EMERGENCY ROOM IF YOU HAVE ANY OF THE FOLLOWING:      Difficulty breathing              Chills and/or fever over 101 F   Persistent vomiting and/or vomiting blood   Severe abdominal pain   Severe chest pain   Black, tarry stools   Bleeding- more than one tablespoon   Any other symptom or condition that you feel may need urgent attention  Your doctor recommends these additional instructions:  If any biopsies were taken, your doctors clinic will contact you in 1 to 2   weeks with any results.  - Patient has a contact number available for emergencies.  The signs and   symptoms of potential delayed complications were discussed with the   patient.  Return to normal activities tomorrow.  Written discharge   instructions were provided to the patient.   - Discharge patient to home.   - Resume previous diet.   - Continue present medications.   - Await pathology results.   - Repeat colonoscopy in 7 years for surveillance.   - Use Linzess (linaclotide) 145 mcg PO daily.  For questions, problems or results please call your physician - Bina Sparks MD at Work:  [phone], Work:  [phone].  [facility] , EMERGENCY ROOM PHONE NUMBER: [phone]  IF A COMPLICATION OR EMERGENCY SITUATION ARISES AND YOU ARE UNABLE TO REACH   YOUR PHYSICIAN - GO DIRECTLY TO THE EMERGENCY ROOM.  Bina Sparks MD  10/30/2023 11:07:43 AM  This report has been verified and signed electronically.  Dear patient,  As a result of recent federal legislation (The Federal Cures Act), you may   receive lab or pathology results from your procedure in your [facility]   account before your physician is able to contact you. Your physician or   their representative will relay the results to you with their   recommendations at their soonest availability.  Thank you,  PROVATION

## 2023-10-30 NOTE — H&P
"Colonoscopy History and Physical    Patient Name: Jeffrey Coulter  MRN: [id number]  : 1985  Date of Procedure:  10/30/2023  Referring Physician: Estephania Medina FNP  Primary Physician: Zach Pinzon NP  Procedure Physician: Bina Sparks MD, MPH     Procedure - Colonoscopy  ASA - per anesthesia  Mallampati - per anesthesia  History of Anesthesia problems - no  Family history Anesthesia problems -  no   Plan of anesthesia - General    Diagnosis: rectal pain  Chief Complaint: Same as above    HPI: Patient is an 38 y.o. male is here for the above.     Mr. Coulter is a 38 year old CM here for a colonoscopy for rectal pain.     He has had problems with erectile dysfunction and BPH, followed by urology.     He describes pain in his rectum "higher up" that has been intermittent and fluctuating in intensity over the past 7 years.  The symptoms initially started while taking a tire off of a truck.  The pain is described as sharp and crampy and will frequently awaken him in the middle of the night.  The pain will relieve for a short period of time with defecation before reoccurring.      He has had some problems with constipation lately over the past month.  He has gone a few days without a BM and also have hard stools requiring straining.   He does not always feel completely evacuated.  He has been using miralax for the past week without much improved.  He has seen bright red blood in the past which he associates with hemorrhoids.  His appetite is fair and his weight is stable.      He denies nausea, vomiting, hematemesis, odynophagia, dysphagia, early satiety, or abdominal pain.  He reports frequent pyrosis and regurgitation of acid into the back of his throat with subsequent burning for the past several months.  He takes Tums most days of the week with some relief noted.  He has tried OTC PPI medications and H2 blockers, as well as antacids with some relief noted.  He was placed on pantoprazole 40 mg daily " and symptoms have improved on this medication.      CT scan abdomen and pelvis without contrast October 5, 2023 revealed no evidence of nephrolithiasis, hydronephrosis, or hydroureter is visualized.  Mild-to-moderate retained fecal material and gas scattered within the colon is seen.  Please correlate for an element of constipation.      He denies ever having an EGD or colonoscopy done.  He denies regular NSAID use or use of blood thinners.  He denies tobacco or alcohol use.  He denies illicit drug use.  He denies a family history of IBD, colon polyps, or colon cancer.    Last colonoscopy: none  Family history: cousin colon cancer  Anticoagulation: none    ROS:  Constitutional: No fevers, chills, No weight loss  CV: No chest pain  Pulm: No cough, No shortness of breath  GI: see HPI    Medical History:   Past Medical History:   Diagnosis Date    Anxiety     Cervical spondylosis     Mood disorder     Panic disorder (episodic paroxysmal anxiety)     Reactive hypoglycemia     Rectal pain     Spinal stenosis          Surgical History:   Past Surgical History:   Procedure Laterality Date    CYST REMOVAL      eyebrow       Family History:   Family History   Problem Relation Age of Onset    Leukemia Father     Cancer Mother    .    Social History:   Social History     Socioeconomic History    Marital status: Single   Tobacco Use    Smoking status: Never     Passive exposure: Never    Smokeless tobacco: Never   Substance and Sexual Activity    Alcohol use: Not Currently     Comment: socially    Drug use: Never    Sexual activity: Yes     Partners: Female     Birth control/protection: None       Review of patient's allergies indicates:  No Known Allergies    Medications:   Medications Prior to Admission   Medication Sig Dispense Refill Last Dose    cholecalciferol, vitamin D3, 1,250 mcg (50,000 unit) capsule    Taking    pantoprazole (PROTONIX) 40 MG tablet Take 1 tablet (40 mg total) by mouth once daily. 30 tablet 11 Taking  "   sildenafil (REVATIO) 20 mg Tab SMARTSI-5 Tablet(s) By Mouth As Directed   Taking    polyethylene glycol (GLYCOLAX) 17 gram/dose powder Take 17 g by mouth once daily. 510 g 5     tamsulosin (FLOMAX) 0.4 mg Cap Take 1 capsule (0.4 mg total) by mouth once daily. 30 capsule 11          Physical Exam:    Vital Signs:   Vitals:    10/30/23 0927   BP: (!) 151/95   Pulse:    Resp:    Temp:      BP (!) 151/95   Pulse 67   Temp 98.2 °F (36.8 °C) (Oral)   Resp (!) 22   Ht 5' 11" (1.803 m)   Wt 124.7 kg (275 lb)   SpO2 97%   BMI 38.35 kg/m²     General:          Well appearing in no acute distress  Lungs: Clear to auscultation bilaterally, respirations unlabored  Heart: Regular rate and rhythm, S1 and S2 normal, no obvious murmurs  Abdomen:         Soft, non-tender, bowel sounds normal, no masses, no organomegaly        Labs:  Lab Results   Component Value Date    WBC 14.42 (H) 2023    HGB 15.1 2023    HCT 44.5 2023    MCV 86.9 2023     2023     Lab Results   Component Value Date    INR 1.0 2023     Lab Results   Component Value Date     2023    K 4.4 2023    CO2 21 (L) 2023    BUN 11.0 2023    CREATININE 1.20 (H) 2023    LABPROT 7.6 2023    ALBUMIN 4.0 2023    BILITOT 0.2 2023    ALKPHOS 121 2023    ALT 21 2023    AST 14 2023         Assessment and Plan:    History reviewed, vital signs satisfactory, cardiopulmonary status satisfactory.  I have explained the sedation options, risks, benefits, and alternatives of this endoscopic procedure to the patient including but not limited to bleeding, inflammation, infection, perforation, and death.  All questions were answered and the patient consented to proceed with procedure as planned.   The patient is deemed an appropriate candidate for the sedation as planned.      Bina Sparks MD, MPH   of Clinical Medicine  Gastroenterology and " Hepatology  [facility]    10/30/2023  9:48 AM

## 2023-10-31 ENCOUNTER — PATIENT MESSAGE (OUTPATIENT)
Dept: ADMINISTRATIVE | Facility: OTHER | Age: 38
End: 2023-10-31
Payer: MEDICAID

## 2023-10-31 VITALS
HEIGHT: 71 IN | DIASTOLIC BLOOD PRESSURE: 78 MMHG | BODY MASS INDEX: 38.5 KG/M2 | SYSTOLIC BLOOD PRESSURE: 142 MMHG | OXYGEN SATURATION: 99 % | TEMPERATURE: 98 F | HEART RATE: 61 BPM | RESPIRATION RATE: 20 BRPM | WEIGHT: 275 LBS

## 2023-11-02 LAB
ESTROGEN SERPL-MCNC: NORMAL PG/ML
INSULIN SERPL-ACNC: NORMAL U[IU]/ML
LAB AP CLINICAL INFORMATION: NORMAL
LAB AP GROSS DESCRIPTION: NORMAL
LAB AP REPORT FOOTNOTES: NORMAL
T3RU NFR SERPL: NORMAL %

## 2024-01-03 ENCOUNTER — HOSPITAL ENCOUNTER (EMERGENCY)
Facility: HOSPITAL | Age: 39
Discharge: HOME | End: 2024-01-03
Attending: FAMILY MEDICINE
Payer: MEDICAID

## 2024-01-03 VITALS
HEIGHT: 71 IN | WEIGHT: 278 LBS | BODY MASS INDEX: 38.92 KG/M2 | DIASTOLIC BLOOD PRESSURE: 89 MMHG | SYSTOLIC BLOOD PRESSURE: 121 MMHG | TEMPERATURE: 98 F | OXYGEN SATURATION: 98 % | HEART RATE: 84 BPM | RESPIRATION RATE: 18 BRPM

## 2024-01-03 DIAGNOSIS — R07.2 PRECORDIAL PAIN: Primary | ICD-10-CM

## 2024-01-03 DIAGNOSIS — R07.9 CHEST PAIN: ICD-10-CM

## 2024-01-03 LAB
ALBUMIN SERPL-MCNC: 4.2 G/DL (ref 3.5–5)
ALBUMIN/GLOB SERPL: 1.1 RATIO (ref 1.1–2)
ALP SERPL-CCNC: 121 UNIT/L (ref 40–150)
ALT SERPL-CCNC: 40 UNIT/L (ref 0–55)
AST SERPL-CCNC: 21 UNIT/L (ref 5–34)
BASOPHILS # BLD AUTO: 0.05 X10(3)/MCL
BASOPHILS NFR BLD AUTO: 0.3 %
BILIRUB SERPL-MCNC: 0.3 MG/DL
BNP BLD-MCNC: <10 PG/ML
BUN SERPL-MCNC: 14 MG/DL (ref 8.9–20.6)
CALCIUM SERPL-MCNC: 9.6 MG/DL (ref 8.4–10.2)
CHLORIDE SERPL-SCNC: 108 MMOL/L (ref 98–107)
CK MB SERPL-MCNC: <1 NG/ML
CK SERPL-CCNC: 73 U/L (ref 30–200)
CO2 SERPL-SCNC: 23 MMOL/L (ref 22–29)
CREAT SERPL-MCNC: 1.05 MG/DL (ref 0.73–1.18)
EOSINOPHIL # BLD AUTO: 0.04 X10(3)/MCL (ref 0–0.9)
EOSINOPHIL NFR BLD AUTO: 0.3 %
ERYTHROCYTE [DISTWIDTH] IN BLOOD BY AUTOMATED COUNT: 12.3 % (ref 11.5–17)
FLUAV AG UPPER RESP QL IA.RAPID: NOT DETECTED
FLUBV AG UPPER RESP QL IA.RAPID: NOT DETECTED
GFR SERPLBLD CREATININE-BSD FMLA CKD-EPI: >60 MLS/MIN/1.73/M2
GLOBULIN SER-MCNC: 3.9 GM/DL (ref 2.4–3.5)
GLUCOSE SERPL-MCNC: 135 MG/DL (ref 74–100)
HCT VFR BLD AUTO: 46.6 % (ref 42–52)
HGB BLD-MCNC: 15.7 G/DL (ref 14–18)
IMM GRANULOCYTES # BLD AUTO: 0.05 X10(3)/MCL (ref 0–0.04)
IMM GRANULOCYTES NFR BLD AUTO: 0.3 %
LYMPHOCYTES # BLD AUTO: 1.81 X10(3)/MCL (ref 0.6–4.6)
LYMPHOCYTES NFR BLD AUTO: 12.5 %
MCH RBC QN AUTO: 29.2 PG (ref 27–31)
MCHC RBC AUTO-ENTMCNC: 33.7 G/DL (ref 33–36)
MCV RBC AUTO: 86.8 FL (ref 80–94)
MONOCYTES # BLD AUTO: 0.87 X10(3)/MCL (ref 0.1–1.3)
MONOCYTES NFR BLD AUTO: 6 %
NEUTROPHILS # BLD AUTO: 11.67 X10(3)/MCL (ref 2.1–9.2)
NEUTROPHILS NFR BLD AUTO: 80.6 %
NRBC BLD AUTO-RTO: 0 %
PLATELET # BLD AUTO: 297 X10(3)/MCL (ref 130–400)
PMV BLD AUTO: 10.1 FL (ref 7.4–10.4)
POTASSIUM SERPL-SCNC: 3.9 MMOL/L (ref 3.5–5.1)
PROT SERPL-MCNC: 8.1 GM/DL (ref 6.4–8.3)
RBC # BLD AUTO: 5.37 X10(6)/MCL (ref 4.7–6.1)
SARS-COV-2 RNA RESP QL NAA+PROBE: NOT DETECTED
SODIUM SERPL-SCNC: 141 MMOL/L (ref 136–145)
TROPONIN I SERPL-MCNC: <0.01 NG/ML (ref 0–0.04)
TROPONIN I SERPL-MCNC: <0.01 NG/ML (ref 0–0.04)
WBC # SPEC AUTO: 14.49 X10(3)/MCL (ref 4.5–11.5)

## 2024-01-03 PROCEDURE — 93010 ELECTROCARDIOGRAM REPORT: CPT | Mod: ,,, | Performed by: INTERNAL MEDICINE

## 2024-01-03 PROCEDURE — 85025 COMPLETE CBC W/AUTO DIFF WBC: CPT | Performed by: FAMILY MEDICINE

## 2024-01-03 PROCEDURE — 83880 ASSAY OF NATRIURETIC PEPTIDE: CPT | Performed by: FAMILY MEDICINE

## 2024-01-03 PROCEDURE — 82550 ASSAY OF CK (CPK): CPT | Performed by: FAMILY MEDICINE

## 2024-01-03 PROCEDURE — 84484 ASSAY OF TROPONIN QUANT: CPT | Performed by: FAMILY MEDICINE

## 2024-01-03 PROCEDURE — 93005 ELECTROCARDIOGRAM TRACING: CPT

## 2024-01-03 PROCEDURE — 25000003 PHARM REV CODE 250: Performed by: FAMILY MEDICINE

## 2024-01-03 PROCEDURE — 0240U COVID/FLU A&B PCR: CPT | Performed by: FAMILY MEDICINE

## 2024-01-03 PROCEDURE — 82553 CREATINE MB FRACTION: CPT | Performed by: FAMILY MEDICINE

## 2024-01-03 PROCEDURE — 80053 COMPREHEN METABOLIC PANEL: CPT | Performed by: FAMILY MEDICINE

## 2024-01-03 PROCEDURE — 99285 EMERGENCY DEPT VISIT HI MDM: CPT | Mod: 25

## 2024-01-03 RX ORDER — MAG HYDROX/ALUMINUM HYD/SIMETH 200-200-20
30 SUSPENSION, ORAL (FINAL DOSE FORM) ORAL ONCE
Status: COMPLETED | OUTPATIENT
Start: 2024-01-03 | End: 2024-01-03

## 2024-01-03 RX ORDER — LIDOCAINE HYDROCHLORIDE 20 MG/ML
15 SOLUTION OROPHARYNGEAL ONCE
Status: COMPLETED | OUTPATIENT
Start: 2024-01-03 | End: 2024-01-03

## 2024-01-03 RX ORDER — ONDANSETRON 4 MG/1
4 TABLET, ORALLY DISINTEGRATING ORAL
Status: COMPLETED | OUTPATIENT
Start: 2024-01-03 | End: 2024-01-03

## 2024-01-03 RX ADMIN — ONDANSETRON 4 MG: 4 TABLET, ORALLY DISINTEGRATING ORAL at 09:01

## 2024-01-03 RX ADMIN — ALUMINUM HYDROXIDE, MAGNESIUM HYDROXIDE, AND DIMETHICONE 30 ML: 200; 20; 200 SUSPENSION ORAL at 09:01

## 2024-01-03 RX ADMIN — LIDOCAINE HYDROCHLORIDE 15 ML: 20 SOLUTION ORAL at 09:01

## 2024-01-03 NOTE — Clinical Note
"Jeffrey Coulter (Daniel) was seen and treated in our emergency department on 1/3/2024.  He may return to work on 01/05/2024.       If you have any questions or concerns, please don't hesitate to call.       RN    "

## 2024-01-04 NOTE — CONSULTS
[facility] - Emergency Dept  Cardiology  Consult Note    Patient Name: Jeffrey Coulter  MRN: [id number]  Admission Date: 1/3/2024  Hospital Length of Stay: 0 days  Code Status: No Order   Attending Provider: Alisa Austin *   Consulting Provider: Yevgeniy Rodriguez NP  Primary Care Physician: Zach Pinzon NP  Principal Problem:<principal problem not specified>    Patient information was obtained from patient and ER records.     Subjective:     Chief Complaint:  Chest pain, Nausea      HPI: This is a 38-year-old male unknown to [facility] presented to the ER with complaints of chest pain substernal with radiation to the left arm and jaw with associated nausea.  The pain started while at rest and continued while driving to the ER.  Once arriving to the ER the pain had decreased in intensity.  Denies any complaints of cardiac history and does have a significant past medical history of anxiety and panic disorders.  His EKG showed normal sinus rhythm with no acute ST or T wave abnormalities  Troponins were normal on the first set.  [facility] was consulted for cardiac evaluation regarding chest pain.    Past medical history: Anxiety, mood disorder, panic disorder, rectal pain, spinal stenosis  Past surgical history: Cyst removal, colonoscopy with polypectomy  Family history: Leukemia father  Cancer mother  Social history denies excessive EtOH, denies any illicit drug use, denies smoking           Review of patient's allergies indicates:  No Known Allergies    Current Facility-Administered Medications on File Prior to Encounter   Medication    lactated ringers infusion     Current Outpatient Medications on File Prior to Encounter   Medication Sig    cholecalciferol, vitamin D3, 1,250 mcg (50,000 unit) capsule     linaCLOtide (LINZESS) 145 mcg Cap capsule Take 1 capsule (145 mcg total) by mouth before breakfast.    pantoprazole (PROTONIX) 40 MG tablet Take 1 tablet (40 mg total) by mouth once daily.    polyethylene  glycol (GLYCOLAX) 17 gram/dose powder Take 17 g by mouth once daily.    [DISCONTINUED] sildenafil (REVATIO) 20 mg Tab SMARTSI-5 Tablet(s) By Mouth As Directed    [DISCONTINUED] tamsulosin (FLOMAX) 0.4 mg Cap Take 1 capsule (0.4 mg total) by mouth once daily.       Review of Systems:  Review of Systems   Constitutional: Negative.    HENT: Negative.     Eyes: Negative.    Respiratory:  Positive for shortness of breath.    Cardiovascular:  Positive for chest pain.   Gastrointestinal:  Positive for nausea.   Endocrine: Negative.    Genitourinary: Negative.    Musculoskeletal: Negative.    Skin: Negative.    Allergic/Immunologic: Negative.    Neurological:  Positive for light-headedness.   Hematological: Negative.        Objective:     Vital Signs (Most Recent):  Temp: 98.8 °F (37.1 °C) (24)  Pulse: 91 (24)  Resp: 19 (24)  BP: (!) 143/92 (24)  SpO2: 97 % (24) Vital Signs (24h Range):  Temp:  [98.8 °F (37.1 °C)] 98.8 °F (37.1 °C)  Pulse:  [88-91] 91  Resp:  [16-19] 19  SpO2:  [97 %] 97 %  BP: (143-150)/(92) 143/92     Weight: 126.1 kg (278 lb)  Body mass index is 38.77 kg/m².    SpO2: 97 %       No intake or output data in the 24 hours ending 24 2232    Lines/Drains/Airways       Peripheral Intravenous Line  Duration                  Peripheral IV - Single Lumen 24 18 G;1 1/4 in Left;Posterior Hand <1 day                      Significant Labs:   Recent Lab Results         24        Influenza A, Molecular Not Detected         Influenza B, Molecular Not Detected         Albumin/Globulin Ratio   1.1       Albumin   4.2       ALP   121       ALT   40       AST   21       Baso #   0.05       Basophil %   0.3       BILIRUBIN TOTAL   0.3       BNP   <10.0       BUN   14.0       Calcium   9.6       Chloride   108       CO2   23       CPK   73       CPK MB   <1.0       Creatinine   1.05       eGFR   >60       Eos #   0.04        Eosinophil %   0.3       Globulin, Total   3.9       Glucose   135       Hematocrit   46.6       Hemoglobin   15.7       Immature Grans (Abs)   0.05       Immature Granulocytes   0.3       Lymph #   1.81       LYMPH %   12.5       MCH   29.2       MCHC   33.7       MCV   86.8       Mono #   0.87       Mono %   6.0       MPV   10.1       Neut #   11.67       Neut %   80.6       nRBC   0.0       Platelet Count   297       Potassium   3.9       PROTEIN TOTAL   8.1       RBC   5.37       RDW   12.3       SARS-CoV2 (COVID-19) Qualitative PCR Not Detected         Sodium   141       Troponin I   <0.010       WBC   14.49                 Significant Imaging:   Imaging Results              X-Ray Chest AP Portable (Final result)  Result time 01/03/24 21:25:11      Final result by Lenin Whitaker MD (01/03/24 21:25:11)                   Impression:      No acute cardiopulmonary process.      Electronically signed by: Lenin Whitaker  Date:    01/03/2024  Time:    21:25               Narrative:    EXAMINATION:  XR CHEST AP PORTABLE    CLINICAL HISTORY:  Chest Pain;    TECHNIQUE:  Single view of the chest    COMPARISON:  01/26/2017    FINDINGS:  No focal opacification, pleural effusion, or pneumothorax.    The cardiomediastinal silhouette is within normal limits.    No acute osseous abnormality.                                        EKG:  No results found for this visit on 01/03/24.    Telemetry:  SR    Physical Exam:  Physical Exam  Constitutional:       Appearance: He is obese.   HENT:      Mouth/Throat:      Mouth: Mucous membranes are dry.   Cardiovascular:      Rate and Rhythm: Normal rate and regular rhythm.   Pulmonary:      Effort: Pulmonary effort is normal.      Breath sounds: Normal breath sounds.   Skin:     General: Skin is dry.   Neurological:      Mental Status: He is alert.   Psychiatric:         Mood and Affect: Mood normal.         Behavior: Behavior normal.         Home Medications:   Current  Facility-Administered Medications on File Prior to Encounter   Medication Dose Route Frequency Provider Last Rate Last Admin    lactated ringers infusion   Intravenous Continuous Tyesha Lynn MD 10 mL/hr at 10/30/23 0921 New Bag at 10/30/23 0921     Current Outpatient Medications on File Prior to Encounter   Medication Sig Dispense Refill    cholecalciferol, vitamin D3, 1,250 mcg (50,000 unit) capsule       linaCLOtide (LINZESS) 145 mcg Cap capsule Take 1 capsule (145 mcg total) by mouth before breakfast. 30 capsule 11    pantoprazole (PROTONIX) 40 MG tablet Take 1 tablet (40 mg total) by mouth once daily. 30 tablet 11    polyethylene glycol (GLYCOLAX) 17 gram/dose powder Take 17 g by mouth once daily. 510 g 5    [DISCONTINUED] sildenafil (REVATIO) 20 mg Tab SMARTSI-5 Tablet(s) By Mouth As Directed      [DISCONTINUED] tamsulosin (FLOMAX) 0.4 mg Cap Take 1 capsule (0.4 mg total) by mouth once daily. 30 capsule 11       Current Inpatient Medications:  No current facility-administered medications for this encounter.    Current Outpatient Medications:     cholecalciferol, vitamin D3, 1,250 mcg (50,000 unit) capsule, , Disp: , Rfl:     linaCLOtide (LINZESS) 145 mcg Cap capsule, Take 1 capsule (145 mcg total) by mouth before breakfast., Disp: 30 capsule, Rfl: 11    pantoprazole (PROTONIX) 40 MG tablet, Take 1 tablet (40 mg total) by mouth once daily., Disp: 30 tablet, Rfl: 11    polyethylene glycol (GLYCOLAX) 17 gram/dose powder, Take 17 g by mouth once daily., Disp: 510 g, Rfl: 5    Facility-Administered Medications Ordered in Other Encounters:     lactated ringers infusion, , Intravenous, Continuous, Tyesha Lynn MD, Last Rate: 10 mL/hr at 10/30/23 0921, New Bag at 10/30/23 0921           Assessment:     IMPRESSION:  Palpitations   -NSR on tele EKG  Chest pain   UCHE   -Moderately compliant with CPAP  Anxiety   GERD        PLAN:     PLAN:  Troponins and EKG have returned normal thus far  Pain is  reproducible with palpation  His symptoms are described and seemingly initiated with palpations however NSR is on EKG and Tele  Recommend obtaining 2nd set of troponins for rule out   If 2nd troponin returns normal he can be discharged form a cardiac stand point and follow up with [facility] in [place]   Recommend event monitor or MCT as outpatient to evaluate for arrhythmia   Please call should any acute changes occur.  Thank you for your consult.     Yevgeniy Rodriguez NP  Cardiology  [facility] - Emergency Dept  01/03/2024 10:32 PM

## 2024-01-04 NOTE — ED PROVIDER NOTES
Encounter Date: 1/3/2024       History     Chief Complaint   Patient presents with    Chest Pain     C/o CP and nausea starting 30-45 min ago that worsens with movement. Denies Vomiting, diarrhea, and SOB. States he had chills right before the CP started. AAOx4.      This patient is a 38-year-old male with no significant past medical history who states that he was laying down watching TV when he got sudden substernal chest pain that radiated to his left arm, making his left jaw numb and making him nauseous.  States that he was having the pain the whole time driving to [place] from [facility].  The pain eased up once he got to the ER but he still says it has a 1-2 and he still having pain in his left arm.  Patient denies any history of heart problems but he does have a history of anxiety    The history is provided by the patient.     Review of patient's allergies indicates:  No Known Allergies  Past Medical History:   Diagnosis Date    Anxiety     Cervical spondylosis     Mood disorder     Panic disorder (episodic paroxysmal anxiety)     Reactive hypoglycemia     Rectal pain     Spinal stenosis      Past Surgical History:   Procedure Laterality Date    COLONOSCOPY, WITH POLYPECTOMY USING SNARE N/A 10/30/2023    Procedure: COLONOSCOPY, WITH POLYPECTOMY USING SNARE;  Surgeon: Bina Sparks MD;  Location: [facility] ENDOSCOPY;  Service: Gastroenterology;  Laterality: N/A;    CYST REMOVAL      eyebrow     Family History   Problem Relation Age of Onset    Leukemia Father     Cancer Mother      Social History     Tobacco Use    Smoking status: Never     Passive exposure: Never    Smokeless tobacco: Never   Substance Use Topics    Alcohol use: Not Currently     Comment: socially    Drug use: Never     Review of Systems   Constitutional: Negative.    HENT: Negative.     Respiratory: Negative.     Cardiovascular:  Positive for chest pain.   Gastrointestinal:  Positive for nausea.   Endocrine: Negative.    Musculoskeletal:  Negative.    Skin: Negative.    Neurological: Negative.    Psychiatric/Behavioral: Negative.     All other systems reviewed and are negative.      Physical Exam     Initial Vitals [01/03/24 2054]   BP Pulse Resp Temp SpO2   (!) 150/92 88 16 98.8 °F (37.1 °C) 97 %      MAP       --         Physical Exam    Nursing note and vitals reviewed.  Constitutional: He appears well-developed and well-nourished.   HENT:   Head: Normocephalic.   Eyes: Pupils are equal, round, and reactive to light.   Neck:   Normal range of motion.  Cardiovascular:  Normal rate and regular rhythm.           Pulmonary/Chest: Breath sounds normal.   Abdominal: Abdomen is soft. Bowel sounds are normal.   Musculoskeletal:         General: Normal range of motion.      Cervical back: Normal range of motion.     Neurological: He is alert and oriented to person, place, and time. GCS score is 15. GCS eye subscore is 4. GCS verbal subscore is 5. GCS motor subscore is 6.   Skin: Skin is warm and dry.   Psychiatric: He has a normal mood and affect.         ED Course   Procedures  Labs Reviewed   COMPREHENSIVE METABOLIC PANEL - Abnormal; Notable for the following components:       Result Value    Chloride 108 (*)     Glucose Level 135 (*)     Globulin 3.9 (*)     All other components within normal limits   CBC WITH DIFFERENTIAL - Abnormal; Notable for the following components:    WBC 14.49 (*)     Neut # 11.67 (*)     IG# 0.05 (*)     All other components within normal limits   TROPONIN I - Normal   B-TYPE NATRIURETIC PEPTIDE - Normal   CK-MB - Normal   CK - Normal   COVID/FLU A&B PCR - Normal    Narrative:     The Xpert Xpress SARS-CoV-2/FLU/RSV plus is a rapid, multiplexed real-time PCR test intended for the simultaneous qualitative detection and differentiation of SARS-CoV-2, Influenza A, Influenza B, and respiratory syncytial virus (RSV) viral RNA in either nasopharyngeal swab or nasal swab specimens.         TROPONIN I - Normal   CBC W/ AUTO  DIFFERENTIAL    Narrative:     The following orders were created for panel order CBC auto differential.  Procedure                               Abnormality         Status                     ---------                               -----------         ------                     CBC with Differential[[id number]]       Abnormal            Final result                 Please view results for these tests on the individual orders.     EKG Readings: (Independently Interpreted)   Initial Reading: No STEMI. Rhythm: Normal Sinus Rhythm. Heart Rate: 90. Ectopy: No Ectopy. Conduction: Normal. ST Segments: Normal ST Segments. T Waves: Normal. Clinical Impression: Normal Sinus Rhythm       Imaging Results              X-Ray Chest AP Portable (Final result)  Result time 01/03/24 21:25:11      Final result by Lenin Whitaker MD (01/03/24 21:25:11)                   Impression:      No acute cardiopulmonary process.      Electronically signed by: Lenin Whitaker  Date:    01/03/2024  Time:    21:25               Narrative:    EXAMINATION:  XR CHEST AP PORTABLE    CLINICAL HISTORY:  Chest Pain;    TECHNIQUE:  Single view of the chest    COMPARISON:  01/26/2017    FINDINGS:  No focal opacification, pleural effusion, or pneumothorax.    The cardiomediastinal silhouette is within normal limits.    No acute osseous abnormality.                                       Medications   ondansetron disintegrating tablet 4 mg (4 mg Oral Given 1/3/24 2136)   aluminum-magnesium hydroxide-simethicone 200-200-20 mg/5 mL suspension 30 mL (30 mLs Oral Given 1/3/24 2138)     And   LIDOcaine viscous HCl 2% oral solution 15 mL (15 mLs Oral Given 1/3/24 2138)     Medical Decision Making  This patient is a 38-year-old male who states that he was lying down watching TV when he suddenly got substernal chest pain radiating to the left arm, left side of his jaw became numb and he became very nauseous.  Differential diagnosis:  Gastritis, MI, angina    Amount  and/or Complexity of Data Reviewed  Labs: ordered.     Details: COVID, flu tests were negative, cardiac enzymes came back negative, CMP shows a chloride of 108, glucose of 135, WBC count was 14.49 and the rest of his CBC was normal  Radiology: ordered.     Details: Chest x-ray was normal  Discussion of management or test interpretation with external provider(s): Spoke with PRISCILA Miranda, states that we should keep the patient until we get a 2nd troponin that is negative.  [facility] will get in touch with the patient for Holter monitor and follow-up appointment.  He feels that the patient maybe having some arrhythmias because he has sleep apnea and the patient describes some palpitations in his history                                      Clinical Impression:  Final diagnoses:  [R07.9] Chest pain  [R07.2] Precordial pain (Primary)          ED Disposition Condition    Discharge Stable          ED Prescriptions    None       Follow-up Information       Follow up With Specialties Details Why Contact Zach Johnson NP Family Medicine Schedule an appointment as soon as possible for a visit in 2 days  [address] [place] [address] [address] [id number]  [phone]               Alisa Austin MD  01/03/24 [phone]       Alisa Austin MD  01/03/24 [phone]

## 2024-01-04 NOTE — ED TRIAGE NOTES
Spoke with Carol @ [facility] for consult. All questions answered regarding pt and all documents faxed. Will have Zach with [facility] call via teledoc.

## 2024-01-15 PROBLEM — K29.51 GASTROINTESTINAL HEMORRHAGE ASSOCIATED WITH CHRONIC GASTRITIS: Status: RESOLVED | Noted: 2023-10-10 | Resolved: 2024-01-15

## 2024-03-28 ENCOUNTER — OFFICE VISIT (OUTPATIENT)
Dept: GASTROENTEROLOGY | Facility: CLINIC | Age: 39
End: 2024-03-28
Payer: MEDICAID

## 2024-03-28 VITALS
HEIGHT: 71 IN | HEART RATE: 86 BPM | DIASTOLIC BLOOD PRESSURE: 84 MMHG | SYSTOLIC BLOOD PRESSURE: 144 MMHG | TEMPERATURE: 98 F | BODY MASS INDEX: 41.05 KG/M2 | RESPIRATION RATE: 10 BRPM | WEIGHT: 293.19 LBS | OXYGEN SATURATION: 96 %

## 2024-03-28 DIAGNOSIS — K62.89 RECTAL PAIN: Primary | ICD-10-CM

## 2024-03-28 DIAGNOSIS — K21.9 GASTROESOPHAGEAL REFLUX DISEASE, UNSPECIFIED WHETHER ESOPHAGITIS PRESENT: ICD-10-CM

## 2024-03-28 DIAGNOSIS — Z86.010 PERSONAL HISTORY OF COLONIC POLYPS: ICD-10-CM

## 2024-03-28 DIAGNOSIS — R07.89 ATYPICAL CHEST PAIN: ICD-10-CM

## 2024-03-28 DIAGNOSIS — K59.09 CHRONIC CONSTIPATION: ICD-10-CM

## 2024-03-28 PROBLEM — Z86.0100 PERSONAL HISTORY OF COLONIC POLYPS: Status: ACTIVE | Noted: 2024-03-28

## 2024-03-28 PROCEDURE — 1159F MED LIST DOCD IN RCRD: CPT | Mod: CPTII,,, | Performed by: NURSE PRACTITIONER

## 2024-03-28 PROCEDURE — 3079F DIAST BP 80-89 MM HG: CPT | Mod: CPTII,,, | Performed by: NURSE PRACTITIONER

## 2024-03-28 PROCEDURE — 1160F RVW MEDS BY RX/DR IN RCRD: CPT | Mod: CPTII,,, | Performed by: NURSE PRACTITIONER

## 2024-03-28 PROCEDURE — 99214 OFFICE O/P EST MOD 30 MIN: CPT | Mod: S$PBB,,, | Performed by: NURSE PRACTITIONER

## 2024-03-28 PROCEDURE — 99215 OFFICE O/P EST HI 40 MIN: CPT | Mod: PBBFAC | Performed by: NURSE PRACTITIONER

## 2024-03-28 PROCEDURE — 3077F SYST BP >= 140 MM HG: CPT | Mod: CPTII,,, | Performed by: NURSE PRACTITIONER

## 2024-03-28 PROCEDURE — 3008F BODY MASS INDEX DOCD: CPT | Mod: CPTII,,, | Performed by: NURSE PRACTITIONER

## 2024-03-28 RX ORDER — PANTOPRAZOLE SODIUM 40 MG/1
40 TABLET, DELAYED RELEASE ORAL DAILY
Qty: 30 TABLET | Refills: 11 | Status: SHIPPED | OUTPATIENT
Start: 2024-03-28 | End: 2025-03-28

## 2024-03-28 NOTE — LETTER
March 28, 2024      [facility]  [address] [facility] [address]  Phone: [phone]       Patient: Jeffrey Coulter   YOB: 1985  Date of Visit: 03/28/2024    To Whom It May Concern:    Emmett Coulter  was at [facility] on 03/28/2024. The patient may return to work on 3/29/24  If you have any questions or concerns, or if I can be of further assistance, please do not hesitate to contact me.    Sincerely,    Estephania Medina NP

## 2024-03-28 NOTE — ASSESSMENT & PLAN NOTE
GERD lifestyle modifications  Reflux precautions  Avoid NSAID use  Avoid alcohol use   Continue pantoprazole 40 mg daily   EGD ordered  Cardiology referral ordered   ER precautions provided

## 2024-03-28 NOTE — PROGRESS NOTES
"Subjective:       Patient ID: Jeffrey Coulter is a 38 y.o. male.    Chief Complaint: Rectal Pain, Gastroesophageal Reflux, and follow up     This 38-year-old  male with anxiety and mood disorder presents unaccompanied for a follow-up visit.  He was referred for rectal pain and seen October 19, 2023.  He reported noticing a decreased sex drive over the past few months and difficulty maintaining an erection during intercourse.  He had right-sided testicular pain noted with ejaculation.  He was started on Viagra shortly after onset of symptoms which helped with his erection.  He had a testicular ultrasound done September 12, 2023 with findings of small bilateral hydroceles, left greater than right and no varicocele.  He was recommended to wear boxer briefs.  He saw Urology September 21, 2023 and recommended to start tamsulosin which had not started.  He reported being able to initiate a stream but having difficulty maintaining a stream during urination.  He noticed dribbling at the end of urination.  He described pain in his rectum "higher up" that had been intermittent and fluctuating in intensity over the past 7 years.  The symptoms initially started while taking a tire off of a truck.  The pain was described as sharp and crampy and would frequently awaken him in the middle of the night.  The pain would relieve for a short period of time with defecation before reoccurring.  Bowel habits for the most part were described as formed stools.  He did not always feel completely evacuated.  He denied melena, hematochezia, fecal urgency, fecal incontinence, or pain with defecation.  His appetite was fair and his weight was stable.  He denied fever, chills, nausea, vomiting, hematemesis, odynophagia, dysphagia, early satiety, or abdominal pain.  He reported frequent pyrosis and regurgitation of acid into the back of his throat with subsequent burning for the past several months.  He was taking Tums most days of the " week with some relief noted.  He had tried OTC PPI medications and H2 blockers, as well as antacids with some relief noted.  He had not taken the medication consistently or been prescribed a PPI medication in the past.     CT scan abdomen and pelvis without contrast October 5, 2023 revealed no evidence of nephrolithiasis, hydronephrosis, or hydroureter is visualized.  There is grossly symmetric bilateral perinephric stranding which is nonspecific, possibly related to medical-renal disease.  Please correlate with patient's clinical and laboratory findings.  Mild-to-moderate retained fecal material and gas scattered within the colon is seen.  Please correlate for an element of constipation.  Additional findings and details as above.     Rectal exam completed and revealed no external hemorrhoids or skin tags noted on exam, mild tenderness noted with MORENA, no masses, fissures, or internal hemorrhoids palpated, hard stool noted in rectal vault.    He underwent colonoscopy October 30, 2023 with findings of sessile serrated polyp in the cecum, tubular adenomatous polyp in the transverse colon, and internal hemorrhoids with a recommended recall of 7 years.    Today, he presents for a follow-up visit.  He reports good relief symptoms in regards to rectal pain and constipation.  He has 1 formed stool every 1-2 days and feels completely evacuated with defecation.  He denies frequent straining or having to sit on the toilet for long periods of time in order to have a bowel movement.  He has not had to take Linzess or any other medication to aid with bowel habit regulation.  He denies melena, hematochezia, fecal urgency, fecal incontinence, or pain with defecation.  He denies nocturnal awakening with rectal pain since his colonoscopy.  He missed his appointment with Urology earlier this year.  We will follow-up with Urology Clinic to get him rescheduled.  He continues with occasional testicular pain and difficulty with  maintaining a stream during urination as described at time of last office visit.  He reports persistent intermittent symptoms of pyrosis and regurgitation of acid into the back of his throat that has somewhat improved frequency over time.  He takes pantoprazole 40 mg daily and still has to occasionally take OTC Tums.  He denies appetite changes, unintentional weight loss, fever, chills, nausea, vomiting, hematemesis, odynophagia, dysphagia, belching, bloating, or early satiety.  He was evaluated in the ED January 3, 2024 with chest pain.  Cardiac workup was unremarkable.  He is requesting cardiology referral.     He denies ever having an EGD done.  He denies regular NSAID use or use of blood thinners.  He denies tobacco or alcohol use.  He denies illicit drug use.  He denies a family history of IBD, colon polyps, or colon cancer.    Review of patient's allergies indicates:  No Known Allergies    Past Medical History:   Diagnosis Date    Anxiety     Cervical spondylosis     Mood disorder     Panic disorder (episodic paroxysmal anxiety)     Reactive hypoglycemia     Rectal pain     Spinal stenosis      Past Surgical History:   Procedure Laterality Date    COLONOSCOPY, WITH POLYPECTOMY USING SNARE N/A 10/30/2023    Procedure: COLONOSCOPY, WITH POLYPECTOMY USING SNARE;  Surgeon: Bina Sparks MD;  Location: [facility] ENDOSCOPY;  Service: Gastroenterology;  Laterality: N/A;    CYST REMOVAL      eyebrow     Family History:   family history includes Cancer in his mother; Leukemia in his father.    Social History:    reports that he has never smoked. He has never been exposed to tobacco smoke. He has never used smokeless tobacco. He reports that he does not currently use alcohol. He reports that he does not use drugs.    Review of Systems  Negative except as noted in the HPI.      Objective:      Physical Exam  Constitutional:       Appearance: Normal appearance.   HENT:      Head: Normocephalic.      Mouth/Throat:       Mouth: Mucous membranes are moist.   Eyes:      Extraocular Movements: Extraocular movements intact.      Conjunctiva/sclera: Conjunctivae normal.      Pupils: Pupils are equal, round, and reactive to light.   Cardiovascular:      Rate and Rhythm: Normal rate and regular rhythm.      Pulses: Normal pulses.      Heart sounds: Normal heart sounds.   Pulmonary:      Effort: Pulmonary effort is normal.      Breath sounds: Normal breath sounds.   Abdominal:      General: Bowel sounds are normal.      Palpations: Abdomen is soft.   Musculoskeletal:         General: Normal range of motion.      Cervical back: Normal range of motion and neck supple.   Skin:     General: Skin is warm and dry.   Neurological:      General: No focal deficit present.      Mental Status: He is alert and oriented to person, place, and time.   Psychiatric:         Mood and Affect: Mood normal.         Behavior: Behavior normal.         Thought Content: Thought content normal.         Judgment: Judgment normal.         Home Medications:     Current Outpatient Medications   Medication Sig    cholecalciferol, vitamin D3, 1,250 mcg (50,000 unit) capsule     pantoprazole (PROTONIX) 40 MG tablet Take 1 tablet (40 mg total) by mouth once daily.    linaCLOtide (LINZESS) 145 mcg Cap capsule Take 1 capsule (145 mcg total) by mouth before breakfast. (Patient not taking: Reported on 3/28/2024)    polyethylene glycol (GLYCOLAX) 17 gram/dose powder Take 17 g by mouth once daily. (Patient not taking: Reported on 3/28/2024)     Facility-Administered Medications Ordered in Other Visits   Medication Frequency    lactated ringers infusion Continuous     Laboratory Results:     Recent Results (from the past 4032 hour(s))   POCT glucose    Collection Time: 10/30/23  9:11 AM   Result Value Ref Range    POCT Glucose 92 70 - 110 mg/dL   Specimen to Pathology    Collection Time: 10/30/23 10:39 AM   Result Value Ref Range    Case Report       [facility] Surgical Pathology          "                   Case: [id number]                                 Authorizing Provider:  Bina Sparks MD   Collected:           10/30/2023 10:39 AM          Ordering Location:     [facility] -       Received:            10/30/2023 12:26 PM                                 Endoscopy                                                                    Pathologist:           Reshma Dominguez MD                                                        Specimens:   1) - Intestine Large, Cecum, Cecum colon polyp                                                      2) - Intestine Large, Transverse Colon, Transverse colon polup                             Final Diagnosis         1. Cecum colon polyp, Polypectomy:  - Sessile serrated polyp.    - Negative for malignancy.    2. Transverse colon polyp, Polypectomy:  - Tubular adenoma.  - No evidence of malignancy.          Clinical Information       Procedure:  COLONOSCOPY, WITH POLYPECTOMY USING SNARE  Pre-op Diagnosis:  K62.89 - Rectal pain [ICD-10-CM]  K59.09 - Chronic constipation [ICD-10-CM]  Post-op Diagnosis:  K62.89 - Rectal pain [ICD-10-CM]  K59.09 - Chronic constipation [ICD-10-CM]      Microscopic Description       A microscopic examination was performed and the diagnosis reflects the findings.          Gross Description       1. Intestine Large, Cecum, Cecum colon polyp:   Received in formalin are multiple fragments of tan soft tissue ranging from 1 to 5 mm. Entirely submitted in a single cassette.  2. Intestine Large, Transverse Colon, Transverse colon polup:   Received in formalin are multiple fragments of tan soft tissue ranging from 1 to 3 mm. Entirely submitted in a single cassette.      Report Footnotes       Unless the case is a "gross only" or additional testing only, the final diagnosis for each specimen is based on a microscopic examination of appropriate tissue sections.     Comprehensive metabolic panel    Collection Time: 01/03/24  9:02 " PM   Result Value Ref Range    Sodium Level 141 136 - 145 mmol/L    Potassium Level 3.9 3.5 - 5.1 mmol/L    Chloride 108 (H) 98 - 107 mmol/L    Carbon Dioxide 23 22 - 29 mmol/L    Glucose Level 135 (H) 74 - 100 mg/dL    Blood Urea Nitrogen 14.0 8.9 - 20.6 mg/dL    Creatinine 1.05 0.73 - 1.18 mg/dL    Calcium Level Total 9.6 8.4 - 10.2 mg/dL    Protein Total 8.1 6.4 - 8.3 gm/dL    Albumin Level 4.2 3.5 - 5.0 g/dL    Globulin 3.9 (H) 2.4 - 3.5 gm/dL    Albumin/Globulin Ratio 1.1 1.1 - 2.0 ratio    Bilirubin Total 0.3 <=1.5 mg/dL    Alkaline Phosphatase 121 40 - 150 unit/L    Alanine Aminotransferase 40 0 - 55 unit/L    Aspartate Aminotransferase 21 5 - 34 unit/L    eGFR >60 mls/min/1.73/m2   Troponin I #1    Collection Time: 01/03/24  9:02 PM   Result Value Ref Range    Troponin-I <0.010 0.000 - 0.045 ng/mL   BNP    Collection Time: 01/03/24  9:02 PM   Result Value Ref Range    Natriuretic Peptide <10.0 <=100.0 pg/mL   CK-MB    Collection Time: 01/03/24  9:02 PM   Result Value Ref Range    Creatine Kinase MB <1.0 <=7.2 ng/mL   CK    Collection Time: 01/03/24  9:02 PM   Result Value Ref Range    Creatine Kinase 73 30 - 200 U/L   CBC with Differential    Collection Time: 01/03/24  9:02 PM   Result Value Ref Range    WBC 14.49 (H) 4.50 - 11.50 x10(3)/mcL    RBC 5.37 4.70 - 6.10 x10(6)/mcL    Hgb 15.7 14.0 - 18.0 g/dL    Hct 46.6 42.0 - 52.0 %    MCV 86.8 80.0 - 94.0 fL    MCH 29.2 27.0 - 31.0 pg    MCHC 33.7 33.0 - 36.0 g/dL    RDW 12.3 11.5 - 17.0 %    Platelet 297 130 - 400 x10(3)/mcL    MPV 10.1 7.4 - 10.4 fL    Neut % 80.6 %    Lymph % 12.5 %    Mono % 6.0 %    Eos % 0.3 %    Basophil % 0.3 %    Lymph # 1.81 0.6 - 4.6 x10(3)/mcL    Neut # 11.67 (H) 2.1 - 9.2 x10(3)/mcL    Mono # 0.87 0.1 - 1.3 x10(3)/mcL    Eos # 0.04 0 - 0.9 x10(3)/mcL    Baso # 0.05 <=0.2 x10(3)/mcL    IG# 0.05 (H) 0 - 0.04 x10(3)/mcL    IG% 0.3 %    NRBC% 0.0 %   COVID/FLU A&B PCR    Collection Time: 01/03/24  9:03 PM   Result Value Ref Range     Influenza A PCR Not Detected Not Detected    Influenza B PCR Not Detected Not Detected    SARS-CoV-2 PCR Not Detected Not Detected, Negative   Troponin I    Collection Time: 01/03/24 10:55 PM   Result Value Ref Range    Troponin-I <0.010 0.000 - 0.045 ng/mL     Assessment/Plan:     Problem List Items Addressed This Visit          GI    Rectal pain - Primary     Resolved  Testicular ultrasound done September 12, 2023 with findings of small bilateral hydroceles, left greater than right and no varicocele.  Followed by urology  CT scan abdomen and pelvis without contrast October 5, 2023 revealed no evidence of nephrolithiasis, hydronephrosis, or hydroureter is visualized.  There is grossly symmetric bilateral perinephric stranding which is nonspecific, possibly related to medical-renal disease.  Please correlate with patient's clinical and laboratory findings.  Mild-to-moderate retained fecal material and gas scattered within the colon is seen.  Please correlate for an element of constipation.  Additional findings and details as above.   He underwent colonoscopy October 30, 2023 with findings of sessile serrated polyp in the cecum, tubular adenomatous polyp in the transverse colon, and internal hemorrhoids with a recommended recall of 7 years.  Recommend soluble fiber supplementation  Avoid straining or sitting on the toilet for long periods of time  Okay to take Linzess 145 mcg as needed  Requested staff reach out to urology clinic to help with getting him rescheduled  Call with updates   ER precautions provided   Follow-up clinic visit with NP in 6 months (after date of scheduled EGD procedure)         Chronic constipation     See plan for primary diagnosis         Gastroesophageal reflux disease     GERD lifestyle modifications  Reflux precautions  Avoid NSAID use  Avoid alcohol use   Continue pantoprazole 40 mg daily   EGD ordered  Cardiology referral ordered   ER precautions provided         Relevant Medications     pantoprazole (PROTONIX) 40 MG tablet    Other Relevant Orders    Case Request Endoscopy: EGD (ESOPHAGOGASTRODUODENOSCOPY) (Completed)    Personal history of colonic polyps     He underwent colonoscopy October 30, 2023 with findings of sessile serrated polyp in the cecum, tubular adenomatous polyp in the transverse colon, and internal hemorrhoids with a recommended recall of 7 years.            Other    Atypical chest pain     Cardiology referral ordered   ER precautions provided         Relevant Medications    pantoprazole (PROTONIX) 40 MG tablet    Other Relevant Orders    Ambulatory referral/consult to Cardiology

## 2024-03-28 NOTE — ASSESSMENT & PLAN NOTE
Resolved  Testicular ultrasound done September 12, 2023 with findings of small bilateral hydroceles, left greater than right and no varicocele.  Followed by urology  CT scan abdomen and pelvis without contrast October 5, 2023 revealed no evidence of nephrolithiasis, hydronephrosis, or hydroureter is visualized.  There is grossly symmetric bilateral perinephric stranding which is nonspecific, possibly related to medical-renal disease.  Please correlate with patient's clinical and laboratory findings.  Mild-to-moderate retained fecal material and gas scattered within the colon is seen.  Please correlate for an element of constipation.  Additional findings and details as above.   He underwent colonoscopy October 30, 2023 with findings of sessile serrated polyp in the cecum, tubular adenomatous polyp in the transverse colon, and internal hemorrhoids with a recommended recall of 7 years.  Recommend soluble fiber supplementation  Avoid straining or sitting on the toilet for long periods of time  Okay to take Linzess 145 mcg as needed  Requested staff reach out to urology clinic to help with getting him rescheduled  Call with updates   ER precautions provided   Follow-up clinic visit with NP in 6 months (after date of scheduled EGD procedure)

## 2024-03-28 NOTE — ASSESSMENT & PLAN NOTE
He underwent colonoscopy October 30, 2023 with findings of sessile serrated polyp in the cecum, tubular adenomatous polyp in the transverse colon, and internal hemorrhoids with a recommended recall of 7 years.

## 2024-05-01 ENCOUNTER — TELEPHONE (OUTPATIENT)
Dept: CARDIOLOGY | Facility: CLINIC | Age: 39
End: 2024-05-01
Payer: MEDICAID

## 2024-05-01 NOTE — TELEPHONE ENCOUNTER
Attempted to contact patient to schedule cardio referred apt, but patient informed that he's being seen at [facility].

## 2024-05-17 ENCOUNTER — HOSPITAL ENCOUNTER (EMERGENCY)
Facility: HOSPITAL | Age: 39
Discharge: HOME | End: 2024-05-17
Attending: STUDENT IN AN ORGANIZED HEALTH CARE EDUCATION/TRAINING PROGRAM
Payer: MEDICAID

## 2024-05-17 VITALS
OXYGEN SATURATION: 98 % | HEART RATE: 82 BPM | RESPIRATION RATE: 16 BRPM | DIASTOLIC BLOOD PRESSURE: 75 MMHG | SYSTOLIC BLOOD PRESSURE: 138 MMHG | WEIGHT: 270 LBS | BODY MASS INDEX: 37.8 KG/M2 | TEMPERATURE: 98 F | HEIGHT: 71 IN

## 2024-05-17 DIAGNOSIS — R55 SYNCOPE: ICD-10-CM

## 2024-05-17 DIAGNOSIS — K21.9 GASTROESOPHAGEAL REFLUX DISEASE, UNSPECIFIED WHETHER ESOPHAGITIS PRESENT: ICD-10-CM

## 2024-05-17 DIAGNOSIS — Z53.20 LEFT BEFORE TREATMENT COMPLETED: ICD-10-CM

## 2024-05-17 DIAGNOSIS — R55 SYNCOPE, UNSPECIFIED SYNCOPE TYPE: ICD-10-CM

## 2024-05-17 DIAGNOSIS — R07.9 CHEST PAIN: ICD-10-CM

## 2024-05-17 DIAGNOSIS — R07.89 ATYPICAL CHEST PAIN: Primary | ICD-10-CM

## 2024-05-17 LAB
ALBUMIN SERPL-MCNC: 4.3 G/DL (ref 3.5–5)
ALBUMIN/GLOB SERPL: 1.1 RATIO (ref 1.1–2)
ALP SERPL-CCNC: 119 UNIT/L (ref 40–150)
ALT SERPL-CCNC: 28 UNIT/L (ref 0–55)
AMPHET UR QL SCN: NEGATIVE
APTT PPP: 26.4 SECONDS (ref 24.6–37.2)
AST SERPL-CCNC: 15 UNIT/L (ref 5–34)
BACTERIA #/AREA URNS AUTO: ABNORMAL /HPF
BARBITURATE SCN PRESENT UR: NEGATIVE
BASOPHILS # BLD AUTO: 0.02 X10(3)/MCL
BASOPHILS NFR BLD AUTO: 0.1 %
BENZODIAZ UR QL SCN: NEGATIVE
BILIRUB SERPL-MCNC: 0.5 MG/DL
BILIRUB UR QL STRIP.AUTO: ABNORMAL
BNP BLD-MCNC: 13.6 PG/ML
BUN SERPL-MCNC: 20 MG/DL (ref 8.9–20.6)
CALCIUM SERPL-MCNC: 9.4 MG/DL (ref 8.4–10.2)
CANNABINOIDS UR QL SCN: NEGATIVE
CHLORIDE SERPL-SCNC: 107 MMOL/L (ref 98–107)
CLARITY UR: CLEAR
CO2 SERPL-SCNC: 24 MMOL/L (ref 22–29)
COCAINE UR QL SCN: NEGATIVE
COLOR UR AUTO: YELLOW
CREAT SERPL-MCNC: 1.14 MG/DL (ref 0.73–1.18)
D DIMER PPP IA.FEU-MCNC: 0.34 UG/ML FEU (ref 0–0.5)
EOSINOPHIL # BLD AUTO: 0 X10(3)/MCL (ref 0–0.9)
EOSINOPHIL NFR BLD AUTO: 0 %
ERYTHROCYTE [DISTWIDTH] IN BLOOD BY AUTOMATED COUNT: 12.2 % (ref 11.5–17)
ETHANOL SERPL-MCNC: <10 MG/DL
FENTANYL UR QL SCN: NEGATIVE
GFR SERPLBLD CREATININE-BSD FMLA CKD-EPI: >60 ML/MIN/1.73/M2
GLOBULIN SER-MCNC: 4 GM/DL (ref 2.4–3.5)
GLUCOSE SERPL-MCNC: 160 MG/DL (ref 74–100)
GLUCOSE UR QL STRIP: NEGATIVE
HCT VFR BLD AUTO: 50.1 % (ref 42–52)
HGB BLD-MCNC: 16.7 G/DL (ref 14–18)
HGB UR QL STRIP: NEGATIVE
IMM GRANULOCYTES # BLD AUTO: 0.06 X10(3)/MCL (ref 0–0.04)
IMM GRANULOCYTES NFR BLD AUTO: 0.4 %
INR PPP: 1
KETONES UR QL STRIP: ABNORMAL
LEUKOCYTE ESTERASE UR QL STRIP: NEGATIVE
LIPASE SERPL-CCNC: 17 U/L
LYMPHOCYTES # BLD AUTO: 0.33 X10(3)/MCL (ref 0.6–4.6)
LYMPHOCYTES NFR BLD AUTO: 2.2 %
MAGNESIUM SERPL-MCNC: 1.9 MG/DL (ref 1.6–2.6)
MCH RBC QN AUTO: 29.3 PG (ref 27–31)
MCHC RBC AUTO-ENTMCNC: 33.3 G/DL (ref 33–36)
MCV RBC AUTO: 87.9 FL (ref 80–94)
MDMA UR QL SCN: NEGATIVE
MONOCYTES # BLD AUTO: 0.53 X10(3)/MCL (ref 0.1–1.3)
MONOCYTES NFR BLD AUTO: 3.6 %
MUCOUS THREADS URNS QL MICRO: ABNORMAL /LPF
NEUTROPHILS # BLD AUTO: 13.76 X10(3)/MCL (ref 2.1–9.2)
NEUTROPHILS NFR BLD AUTO: 93.7 %
NITRITE UR QL STRIP: NEGATIVE
OPIATES UR QL SCN: NEGATIVE
PCP UR QL: NEGATIVE
PH UR STRIP: 5.5 [PH]
PH UR: 5.5 [PH] (ref 3–11)
PLATELET # BLD AUTO: 307 X10(3)/MCL (ref 130–400)
PMV BLD AUTO: 9.4 FL (ref 7.4–10.4)
POTASSIUM SERPL-SCNC: 4.3 MMOL/L (ref 3.5–5.1)
PROT SERPL-MCNC: 8.3 GM/DL (ref 6.4–8.3)
PROT UR QL STRIP: ABNORMAL
PROTHROMBIN TIME: 13.2 SECONDS (ref 12.5–14.5)
RBC # BLD AUTO: 5.7 X10(6)/MCL (ref 4.7–6.1)
RBC #/AREA URNS AUTO: ABNORMAL /HPF
SODIUM SERPL-SCNC: 138 MMOL/L (ref 136–145)
SP GR UR STRIP.AUTO: >=1.03 (ref 1–1.03)
SPECIFIC GRAVITY, URINE AUTO (.000) (OHS): >=1.03 (ref 1–1.03)
SQUAMOUS #/AREA URNS AUTO: ABNORMAL /HPF
TROPONIN I SERPL-MCNC: <0.01 NG/ML (ref 0–0.04)
TSH SERPL-ACNC: 0.49 UIU/ML (ref 0.35–4.94)
UROBILINOGEN UR STRIP-ACNC: 0.2
WBC # SPEC AUTO: 14.7 X10(3)/MCL (ref 4.5–11.5)
WBC #/AREA URNS AUTO: ABNORMAL /HPF

## 2024-05-17 PROCEDURE — 83735 ASSAY OF MAGNESIUM: CPT | Performed by: STUDENT IN AN ORGANIZED HEALTH CARE EDUCATION/TRAINING PROGRAM

## 2024-05-17 PROCEDURE — 93010 ELECTROCARDIOGRAM REPORT: CPT | Mod: ,,, | Performed by: INTERNAL MEDICINE

## 2024-05-17 PROCEDURE — 85025 COMPLETE CBC W/AUTO DIFF WBC: CPT | Performed by: STUDENT IN AN ORGANIZED HEALTH CARE EDUCATION/TRAINING PROGRAM

## 2024-05-17 PROCEDURE — 84443 ASSAY THYROID STIM HORMONE: CPT | Performed by: STUDENT IN AN ORGANIZED HEALTH CARE EDUCATION/TRAINING PROGRAM

## 2024-05-17 PROCEDURE — 25000003 PHARM REV CODE 250: Performed by: STUDENT IN AN ORGANIZED HEALTH CARE EDUCATION/TRAINING PROGRAM

## 2024-05-17 PROCEDURE — 83880 ASSAY OF NATRIURETIC PEPTIDE: CPT | Performed by: STUDENT IN AN ORGANIZED HEALTH CARE EDUCATION/TRAINING PROGRAM

## 2024-05-17 PROCEDURE — 82077 ASSAY SPEC XCP UR&BREATH IA: CPT | Performed by: STUDENT IN AN ORGANIZED HEALTH CARE EDUCATION/TRAINING PROGRAM

## 2024-05-17 PROCEDURE — 81001 URINALYSIS AUTO W/SCOPE: CPT | Mod: XB | Performed by: STUDENT IN AN ORGANIZED HEALTH CARE EDUCATION/TRAINING PROGRAM

## 2024-05-17 PROCEDURE — 84484 ASSAY OF TROPONIN QUANT: CPT | Performed by: STUDENT IN AN ORGANIZED HEALTH CARE EDUCATION/TRAINING PROGRAM

## 2024-05-17 PROCEDURE — 93005 ELECTROCARDIOGRAM TRACING: CPT

## 2024-05-17 PROCEDURE — 80307 DRUG TEST PRSMV CHEM ANLYZR: CPT | Performed by: STUDENT IN AN ORGANIZED HEALTH CARE EDUCATION/TRAINING PROGRAM

## 2024-05-17 PROCEDURE — 85610 PROTHROMBIN TIME: CPT | Performed by: STUDENT IN AN ORGANIZED HEALTH CARE EDUCATION/TRAINING PROGRAM

## 2024-05-17 PROCEDURE — 80053 COMPREHEN METABOLIC PANEL: CPT | Performed by: STUDENT IN AN ORGANIZED HEALTH CARE EDUCATION/TRAINING PROGRAM

## 2024-05-17 PROCEDURE — 85379 FIBRIN DEGRADATION QUANT: CPT | Performed by: STUDENT IN AN ORGANIZED HEALTH CARE EDUCATION/TRAINING PROGRAM

## 2024-05-17 PROCEDURE — 83690 ASSAY OF LIPASE: CPT | Performed by: STUDENT IN AN ORGANIZED HEALTH CARE EDUCATION/TRAINING PROGRAM

## 2024-05-17 PROCEDURE — 85730 THROMBOPLASTIN TIME PARTIAL: CPT | Performed by: STUDENT IN AN ORGANIZED HEALTH CARE EDUCATION/TRAINING PROGRAM

## 2024-05-17 PROCEDURE — 99285 EMERGENCY DEPT VISIT HI MDM: CPT | Mod: 25

## 2024-05-17 RX ORDER — ALUMINUM HYDROXIDE, MAGNESIUM HYDROXIDE, AND SIMETHICONE 1200; 120; 1200 MG/30ML; MG/30ML; MG/30ML
30 SUSPENSION ORAL
Status: COMPLETED | OUTPATIENT
Start: 2024-05-17 | End: 2024-05-17

## 2024-05-17 RX ADMIN — ALUMINUM HYDROXIDE, MAGNESIUM HYDROXIDE, AND SIMETHICONE 30 ML: 200; 200; 20 SUSPENSION ORAL at 01:05

## 2024-05-17 NOTE — ED PROVIDER NOTES
Encounter Date: 5/17/2024       History     Chief Complaint   Patient presents with    Vomiting    Abdominal Pain     Syncope with vomitting afterwards while at work today     HPI    38-year-old male with a past medical history of hypertension, chronic chest pain and anxiety presents emergency department with chest pain and episode of syncope.  Patient states that he was at work today when he started feeling weak.  States he passed out vomited.  States he has also been having some chest pain.  States the chest pain has been going on for awhile now but has been worsening.  Also notes that he has been having worsening shortness of breath on exertion for last few weeks.  States he is currently followed up with [facility] it has been running multiple tests but has not found out anything yet.  States that he is currently nauseated.  Denies any abdominal pain.    Review of patient's allergies indicates:  No Known Allergies  Past Medical History:   Diagnosis Date    Anxiety     Cervical spondylosis     Mood disorder     Panic disorder (episodic paroxysmal anxiety)     Reactive hypoglycemia     Rectal pain     Spinal stenosis      Past Surgical History:   Procedure Laterality Date    COLONOSCOPY, WITH POLYPECTOMY USING SNARE N/A 10/30/2023    Procedure: COLONOSCOPY, WITH POLYPECTOMY USING SNARE;  Surgeon: Bina Sparks MD;  Location: [facility] ENDOSCOPY;  Service: Gastroenterology;  Laterality: N/A;    CYST REMOVAL      eyebrow     Family History   Problem Relation Name Age of Onset    Leukemia Father      Cancer Mother Karlee Cesar      Social History     Tobacco Use    Smoking status: Never     Passive exposure: Never    Smokeless tobacco: Never   Substance Use Topics    Alcohol use: Not Currently     Comment: socially    Drug use: Never     Review of Systems   Constitutional:  Negative for fever.   HENT:  Negative for sore throat.    Respiratory:  Positive for shortness of breath. Negative for cough.    Cardiovascular:   Positive for chest pain.   Gastrointestinal:  Positive for nausea and vomiting. Negative for abdominal pain, constipation and diarrhea.   Genitourinary:  Negative for dysuria.   Musculoskeletal:  Negative for back pain.   Skin:  Negative for rash.   Neurological:  Positive for syncope. Negative for weakness and headaches.   Hematological:  Does not bruise/bleed easily.   All other systems reviewed and are negative.      Physical Exam     Initial Vitals [05/17/24 1148]   BP Pulse Resp Temp SpO2   (!) 147/87 97 16 98.3 °F (36.8 °C) 98 %      MAP       --         Physical Exam    Nursing note and vitals reviewed.  Constitutional: He appears well-developed and well-nourished. He is Obese . No distress.   Cardiovascular:  Normal rate and regular rhythm.           Pulmonary/Chest: Breath sounds normal. No respiratory distress.   Abdominal: Abdomen is soft. There is no abdominal tenderness.   Musculoskeletal:         General: No tenderness. Normal range of motion.     Neurological: He is alert and oriented to person, place, and time.   Skin: Skin is warm. Capillary refill takes less than 2 seconds.         ED Course   Procedures  Labs Reviewed   COMPREHENSIVE METABOLIC PANEL - Abnormal; Notable for the following components:       Result Value    Glucose 160 (*)     Globulin 4.0 (*)     All other components within normal limits   URINALYSIS, REFLEX TO URINE CULTURE - Abnormal; Notable for the following components:    Protein, UA 1+ (*)     Ketones, UA Trace (*)     Bilirubin, UA 1+ (*)     All other components within normal limits   CBC WITH DIFFERENTIAL - Abnormal; Notable for the following components:    WBC 14.70 (*)     Lymph # 0.33 (*)     Neut # 13.76 (*)     IG# 0.06 (*)     All other components within normal limits   URINALYSIS, MICROSCOPIC - Abnormal; Notable for the following components:    Mucous, UA Small (*)     All other components within normal limits   APTT - Normal   LIPASE - Normal   MAGNESIUM - Normal    TROPONIN I - Normal   TSH - Normal   ALCOHOL,MEDICAL (ETHANOL) - Normal   DRUG SCREEN, URINE (BEAKER) - Normal    Narrative:     Cut off concentrations:    Amphetamines - 1000 ng/ml  Barbiturates - 200 ng/ml  Benzodiazepine - 200 ng/ml  Cannabinoids (THC) - 50 ng/ml  Cocaine - 300 ng/ml  Fentanyl - 1.0 ng/ml  MDMA - 500 ng/ml  Opiates - 300 ng/ml   Phencyclidine (PCP) - 25 ng/ml    Specimen submitted for drug analysis and tested for pH and specific gravity in order to evaluate sample integrity. Suspect tampering if specific gravity is <1.003 and/or pH is not within the range of 4.5 - 8.0  False negatives may result form substances such as bleach added to urine.  False positives may result for the presence of a substance with similar chemical structure to the drug or its metabolite.    This test provides only a PRELIMINARY analytical test result. A more specific alternate chemical method must be used in order to obtain a confirmed analytical result. Gas chromatography/mass spectrometry (GC/MS) is the preferred confirmatory method. Other chemical confirmation methods are available. Clinical consideration and professional judgement should be applied to any drug of abuse test result, particularly when preliminary positive results are used.    Positive results will be confirmed only at the physicians request. Unconfirmed screening results are to be used only for medical purposes (treatment).        PROTIME-INR - Normal   B-TYPE NATRIURETIC PEPTIDE - Normal   D DIMER, QUANTITATIVE - Normal   CBC W/ AUTO DIFFERENTIAL    Narrative:     The following orders were created for panel order CBC auto differential.  Procedure                               Abnormality         Status                     ---------                               -----------         ------                     CBC with Differential[[id number]]       Abnormal            Final result                 Please view results for these tests on the individual  orders.   TROPONIN I     EKG Readings: (Independently Interpreted)   Initial Reading: No STEMI. Rhythm: Normal Sinus Rhythm. Heart Rate: 99. Ectopy: No Ectopy. Conduction: Normal. ST Segments: Normal ST Segments. T Waves: Normal. Clinical Impression: Normal Sinus Rhythm     ECG Results              EKG 12-lead (In process)        Collection Time Result Time QRS Duration OHS QTC Calculation    05/17/24 11:56:08 05/17/24 13:49:20 88 438                     In process by Interface, Lab In [facility] (05/17/24 13:49:26)                   Narrative:    Test Reason : R55,    Vent. Rate : 099 BPM     Atrial Rate : 099 BPM     P-R Int : 144 ms          QRS Dur : 088 ms      QT Int : 342 ms       P-R-T Axes : 048 037 019 degrees     QTc Int : 438 ms    Normal sinus rhythm  Normal ECG  When compared with ECG of 03-JAN-2024 20:58,  No significant change was found    Referred By: [email or web address]   SELF           Confirmed By:                                   Imaging Results              X-Ray Chest 1 View (Final result)  Result time 05/17/24 12:47:04      Final result by Bridget Amin MD (05/17/24 12:47:04)                   Impression:      No acute cardiopulmonary abnormality.      Electronically signed by: Bridget Amin  Date:    05/17/2024  Time:    12:47               Narrative:    EXAMINATION:  XR CHEST 1 VIEW    CLINICAL HISTORY:  Chest pain, unspecified    TECHNIQUE:  Single frontal view of the chest was performed.    COMPARISON:  01/03/2024    FINDINGS:  LINES AND TUBES: None    MEDIASTINUM AND JESSA: The cardiac silhouette is normal.    LUNGS: No lobar consolidation. No edema.    PLEURA:No pleural effusion. No pneumothorax.    OTHER: No acute osseous abnormality.                        Wet Read by Ashkan Peacock MD (05/17/24 12:24:55, [facility] - Emergency Dept, Emergency Medicine)    Lungs clear no consolidations                                     Medications   aluminum-magnesium  hydroxide-simethicone 200-200-20 mg/5 mL suspension 30 mL (30 mLs Oral Given 5/17/24 1305)     Medical Decision Making  differential diagnosis  Syncope, vasovagal syncope, cardiogenic syncope, ACS, anxiety, PE, pneumothorax, electrolyte abnormality,  as well as multiple other possible etiologies\      Problems Addressed:  Atypical chest pain: chronic illness or injury  Gastroesophageal reflux disease, unspecified whether esophagitis present: chronic illness or injury  Left before treatment completed: acute illness or injury  Syncope, unspecified syncope type: undiagnosed new problem with uncertain prognosis    Amount and/or Complexity of Data Reviewed  Labs: ordered. Decision-making details documented in ED Course.  Radiology: ordered and independent interpretation performed.  ECG/medicine tests: ordered and independent interpretation performed.    Risk  OTC drugs.  Diagnosis or treatment significantly limited by social determinants of health.               ED Course as of 05/17/24 1406   Fri May 17, 2024   1207 WBC(!): 14.70 [BS]   1207 Hemoglobin: 16.7 [BS]   1207 Hematocrit: 50.1 [BS]   1207 Platelet Count: 307 [BS]   1231 Sodium: 138 [BS]   1231 Potassium: 4.3 [BS]   1231 Chloride: 107 [BS]   1231 CO2: 24 [BS]   1231 Glucose(!): 160 [BS]   1231 BUN: 20.0 [BS]   1231 Creatinine: 1.14 [BS]   1231 Lipase: 17 [BS]   1231 Magnesium : 1.90 [BS]   1245 Troponin I: <0.010 [BS]   1259 Patient states he feels much better.  States he has some indigestion requesting a medicine for this.  States it is different than his chest pain.  States he does have a history of GERD [BS]   1404 Patient states he feels much better and ready to go home.  I informed him I want to check a 3 hour troponin although he states he needs to go because his rise leaving.  He feels much better.  States the Maalox resolved all his symptoms.  States he thinks he just did not eat enough and that is why he had a syncopal episode today.  Has follow-up  with Cardiology on Monday, in 3 days.  I will not make him sign out AMA although I informed him that there is a possibility that we are missing something.  He states understanding. [BS]      ED Course User Index  [BS] Ashkan Peacock MD                           Clinical Impression:  Final diagnoses:  [R55] Syncope  [R07.9] Chest pain  [R07.89] Atypical chest pain (Primary)  [K21.9] Gastroesophageal reflux disease, unspecified whether esophagitis present  [R55] Syncope, unspecified syncope type  [Z53.20] Left before treatment completed          ED Disposition Condition    Discharge Stable          ED Prescriptions    None       Follow-up Information       Follow up With Specialties Details Why Contact Info    Zach Pinzon NP Family Medicine Schedule an appointment as soon as possible for a visit   [address] [place] [address] [address] [address]  [phone]      [facility] - Emergency Dept Emergency Medicine Go to  If symptoms worsen [address]  [facility] [address]  [phone]    Keep appointment with cardiologist  Go to                Ashkan Peacock MD  05/17/24 5778

## 2024-05-18 LAB
OHS QRS DURATION: 88 MS
OHS QTC CALCULATION: 438 MS

## 2024-08-06 ENCOUNTER — PATIENT MESSAGE (OUTPATIENT)
Dept: GASTROENTEROLOGY | Facility: CLINIC | Age: 39
End: 2024-08-06
Payer: MEDICAID

## 2024-09-06 ENCOUNTER — ANESTHESIA EVENT (OUTPATIENT)
Dept: ENDOSCOPY | Facility: HOSPITAL | Age: 39
End: 2024-09-06

## 2024-09-06 NOTE — ANESTHESIA PREPROCEDURE EVALUATION
"                                                                                                             09/06/2024  Jeffrey Coulter is a 38 y.o., male with PMHx of obesity, GERD, anxiety presents for EGD secondary to esophagitis    NO BETA BLOCKER USE    Active Ambulatory Problems     Diagnosis Date Noted    Testicular pain, right 09/21/2023    Benign prostatic hyperplasia with weak urinary stream 09/21/2023    Rectal pain 10/19/2023    Chronic constipation 10/19/2023    Gastroesophageal reflux disease 10/19/2023    Personal history of colonic polyps 03/28/2024    Atypical chest pain 03/28/2024     Resolved Ambulatory Problems     Diagnosis Date Noted    Gastrointestinal hemorrhage associated with chronic gastritis 10/10/2023     Past Medical History:   Diagnosis Date    Anxiety     Cervical spondylosis     Mood disorder     Panic disorder (episodic paroxysmal anxiety)     Reactive hypoglycemia     Spinal stenosis        Pre-op Assessment    I have reviewed the NPO Status.      Review of Systems  Anesthesia Hx:  No problems with previous Anesthesia                Social:  Non-Smoker       Cardiovascular:  Cardiovascular Normal                                            Pulmonary:  Pulmonary Normal                       Renal/:  Renal/ Normal                 Hepatic/GI:     GERD, poorly controlled             Neurological:  Neurology Normal                                      Endocrine:        Obesity / BMI > 30  Psych:   anxiety               Vitals:    09/09/24 0833   BP: (!) 147/80   BP Location: Left arm   Patient Position: Lying   Pulse: 69   Resp: 17   Temp: 36.8 °C (98.3 °F)   TempSrc: Oral   SpO2: 96%   Weight: 129.3 kg (285 lb)   Height: 5' 11" (1.803 m)         Physical Exam  General: Alert, Cooperative and Well nourished    Airway:  Mallampati: II   Mouth Opening: Normal  TM Distance: Normal  Tongue: Normal  Neck ROM: Normal ROM    Dental:  Intact    Chest/Lungs:  Normal Respiratory Rate, " Clear to auscultation    Heart:  Rate: Normal  Rhythm: Regular Rhythm  Sounds: Normal      Lab Results   Component Value Date    WBC 14.70 (H) 05/17/2024    HGB 16.7 05/17/2024    HCT 50.1 05/17/2024    MCV 87.9 05/17/2024     05/17/2024       CMP  Sodium   Date Value Ref Range Status   05/17/2024 138 136 - 145 mmol/L Final     Potassium   Date Value Ref Range Status   05/17/2024 4.3 3.5 - 5.1 mmol/L Final     Chloride   Date Value Ref Range Status   05/17/2024 107 98 - 107 mmol/L Final     CO2   Date Value Ref Range Status   05/17/2024 24 22 - 29 mmol/L Final     Blood Urea Nitrogen   Date Value Ref Range Status   05/17/2024 20.0 8.9 - 20.6 mg/dL Final     Creatinine   Date Value Ref Range Status   05/17/2024 1.14 0.73 - 1.18 mg/dL Final     Calcium   Date Value Ref Range Status   05/17/2024 9.4 8.4 - 10.2 mg/dL Final     Albumin   Date Value Ref Range Status   05/17/2024 4.3 3.5 - 5.0 g/dL Final     Bilirubin Total   Date Value Ref Range Status   05/17/2024 0.5 <=1.5 mg/dL Final     ALP   Date Value Ref Range Status   05/17/2024 119 40 - 150 unit/L Final     AST   Date Value Ref Range Status   05/17/2024 15 5 - 34 unit/L Final     ALT   Date Value Ref Range Status   05/17/2024 28 0 - 55 unit/L Final     eGFR   Date Value Ref Range Status   05/17/2024 >60 mL/min/1.73/m2 Final           Anesthesia Plan  Type of Anesthesia, risks & benefits discussed:    Anesthesia Type: Gen Natural Airway  Intra-op Monitoring Plan: Standard ASA Monitors  Post Op Pain Control Plan: IV/PO Opioids PRN  Induction:  IV  Informed Consent: Informed consent signed with the Patient and all parties understand the risks and agree with anesthesia plan.  All questions answered.   ASA Score: 2  Day of Surgery Review of History & Physical: H&P Update referred to the surgeon/provider.    Ready For Surgery From Anesthesia Perspective.     .

## 2024-09-09 ENCOUNTER — HOSPITAL ENCOUNTER (OUTPATIENT)
Facility: HOSPITAL | Age: 39
Discharge: HOME | End: 2024-09-09
Attending: INTERNAL MEDICINE | Admitting: INTERNAL MEDICINE

## 2024-09-09 ENCOUNTER — ANESTHESIA (OUTPATIENT)
Dept: ENDOSCOPY | Facility: HOSPITAL | Age: 39
End: 2024-09-09

## 2024-09-09 VITALS
HEIGHT: 71 IN | DIASTOLIC BLOOD PRESSURE: 84 MMHG | SYSTOLIC BLOOD PRESSURE: 127 MMHG | BODY MASS INDEX: 39.9 KG/M2 | WEIGHT: 285 LBS | TEMPERATURE: 98 F | HEART RATE: 64 BPM | OXYGEN SATURATION: 98 % | RESPIRATION RATE: 18 BRPM

## 2024-09-09 DIAGNOSIS — K21.9 GASTROESOPHAGEAL REFLUX DISEASE, UNSPECIFIED WHETHER ESOPHAGITIS PRESENT: Primary | ICD-10-CM

## 2024-09-09 PROCEDURE — 63600175 PHARM REV CODE 636 W HCPCS: Performed by: NURSE ANESTHETIST, CERTIFIED REGISTERED

## 2024-09-09 PROCEDURE — 63600175 PHARM REV CODE 636 W HCPCS: Performed by: SPECIALIST

## 2024-09-09 PROCEDURE — 88305 TISSUE EXAM BY PATHOLOGIST: CPT | Mod: TC | Performed by: INTERNAL MEDICINE

## 2024-09-09 PROCEDURE — 27201423 OPTIME MED/SURG SUP & DEVICES STERILE SUPPLY: Performed by: INTERNAL MEDICINE

## 2024-09-09 PROCEDURE — 43239 EGD BIOPSY SINGLE/MULTIPLE: CPT | Performed by: INTERNAL MEDICINE

## 2024-09-09 PROCEDURE — 37000008 HC ANESTHESIA 1ST 15 MINUTES: Performed by: INTERNAL MEDICINE

## 2024-09-09 PROCEDURE — D9220A PRA ANESTHESIA: Mod: ,,, | Performed by: ANESTHESIOLOGY

## 2024-09-09 PROCEDURE — 37000009 HC ANESTHESIA EA ADD 15 MINS: Performed by: INTERNAL MEDICINE

## 2024-09-09 RX ORDER — SODIUM CHLORIDE, SODIUM LACTATE, POTASSIUM CHLORIDE, CALCIUM CHLORIDE 600; 310; 30; 20 MG/100ML; MG/100ML; MG/100ML; MG/100ML
INJECTION, SOLUTION INTRAVENOUS CONTINUOUS
Status: DISCONTINUED | OUTPATIENT
Start: 2024-09-09 | End: 2024-09-09 | Stop reason: HOSPADM

## 2024-09-09 RX ORDER — PROPOFOL 10 MG/ML
INJECTION, EMULSION INTRAVENOUS CONTINUOUS PRN
Status: DISCONTINUED | OUTPATIENT
Start: 2024-09-09 | End: 2024-09-09

## 2024-09-09 RX ADMIN — SODIUM CHLORIDE, POTASSIUM CHLORIDE, SODIUM LACTATE AND CALCIUM CHLORIDE: 600; 310; 30; 20 INJECTION, SOLUTION INTRAVENOUS at 08:09

## 2024-09-09 RX ADMIN — PROPOFOL 140 MCG/KG/MIN: 10 INJECTION, EMULSION INTRAVENOUS at 11:09

## 2024-09-09 NOTE — TRANSFER OF CARE
"Anesthesia Transfer of Care Note    Patient: Jeffrey Coulter    Procedure(s) Performed: Procedure(s) (LRB):  EGD, WITH CLOSED BIOPSY (N/A)    Patient location: GI    Anesthesia Type: general    Transport from OR: Transported from OR on room air with adequate spontaneous ventilation    Post pain: adequate analgesia    Post assessment: no apparent anesthetic complications    Post vital signs: stable    Level of consciousness: awake    Nausea/Vomiting: no nausea/vomiting    Complications: none    Transfer of care protocol was followed      Last vitals: Visit Vitals  BP (!) 147/80 (BP Location: Left arm, Patient Position: Lying)   Pulse 69   Temp 36.8 °C (98.3 °F) (Oral)   Resp 17   Ht 5' 11" (1.803 m)   Wt 129.3 kg (285 lb)   SpO2 99%   BMI 39.75 kg/m²     "

## 2024-09-09 NOTE — H&P
History and Physical    Patient Name: Jeffrey Coulter  MRN: [id number]  : 1985  Date of Procedure:  2024  Referring Physician: Estephania Medina FNP  Primary Physician: Zach Pinzon NP  Procedure Physician: Bina Sparks MD    Procedure - EGD  ASA - per anesthesia  Mallampati - per anesthesia  History of Anesthesia problems - no  Family history Anesthesia problems -  no   Plan of anesthesia - General    Diagnosis:  worsening reflux and dyspepsia  Chief Complaint: Same as above    HPI: Patient is an 38 y.o. male is here for the above. Patient has had reflux that has been worsening over the last few years. He previously attributed to alcohol use but has since quit and has continued symptoms. He denies any regurgitation of food, recent nausea, vomiting, abdominal pain, abnormal bowel movements, or unintentional weight loss. He denies cardiac history and is not currently on blood thinners. He denies any previous EGD.      Last colonoscopy: 10/30/23    ROS:  Constitutional: No fevers, chills, No weight loss  CV: No chest pain  Pulm: No cough, No shortness of breath  GI: see HPI    Medical History:   Past Medical History:   Diagnosis Date    Anxiety     Cervical spondylosis     Mood disorder     Panic disorder (episodic paroxysmal anxiety)     Reactive hypoglycemia     Rectal pain     Spinal stenosis      Surgical History:   Past Surgical History:   Procedure Laterality Date    COLONOSCOPY, WITH POLYPECTOMY USING SNARE N/A 10/30/2023    Procedure: COLONOSCOPY, WITH POLYPECTOMY USING SNARE;  Surgeon: Bina Sparks MD;  Location: [facility] ENDOSCOPY;  Service: Gastroenterology;  Laterality: N/A;    CYST REMOVAL      eyebrow     Family History:   Family History   Problem Relation Name Age of Onset    Leukemia Mother Karlee Cesar     Lung cancer Maternal Grandmother      Parkinsonism Maternal Grandfather      Alzheimer's disease Paternal Grandmother     .  Social History:   Social History     Socioeconomic  History    Marital status: Single   Tobacco Use    Smoking status: Never     Passive exposure: Never    Smokeless tobacco: Never   Substance and Sexual Activity    Alcohol use: Not Currently     Comment: socially    Drug use: Never    Sexual activity: Yes     Partners: Female     Birth control/protection: None       Review of patient's allergies indicates:  No Known Allergies    Medications:   Medications Prior to Admission   Medication Sig Dispense Refill Last Dose    pantoprazole (PROTONIX) 40 MG tablet Take 1 tablet (40 mg total) by mouth once daily. 30 tablet 11 Taking    cholecalciferol, vitamin D3, 1,250 mcg (50,000 unit) capsule every 7 days.       linaCLOtide (LINZESS) 145 mcg Cap capsule Take 1 capsule (145 mcg total) by mouth before breakfast. (Patient not taking: Reported on 3/28/2024) 30 capsule 11      Physical Exam:  Vital Signs: There were no vitals filed for this visit.  There were no vitals taken for this visit.    General: NAD  Lungs: NWOB on RA. Symmetric chest rise and fall  Heart: RR  Abdomen: Soft, NT, ND    Labs:  Lab Results   Component Value Date    WBC 14.70 (H) 05/17/2024    HGB 16.7 05/17/2024    HCT 50.1 05/17/2024    MCV 87.9 05/17/2024     05/17/2024     Lab Results   Component Value Date    INR 1.0 05/17/2024    APTT 26.4 05/17/2024     Lab Results   Component Value Date     05/17/2024    K 4.3 05/17/2024    CO2 24 05/17/2024    BUN 20.0 05/17/2024    CREATININE 1.14 05/17/2024    LABPROT 8.3 05/17/2024    LABPROT 13.2 05/17/2024    ALBUMIN 4.3 05/17/2024    BILITOT 0.5 05/17/2024    ALKPHOS 119 05/17/2024    ALT 28 05/17/2024    AST 15 05/17/2024       Assessment and Plan:   Patient is a 39 yo male with PMH of anxiety who presents today for EGD.     - After discussing risks, benefits, and alternatives, patient elects to proceed with EGD, and any other indicated procedures. All questions and concerns addressed. Written and verbal consent obtained.     Nola Agarwal  MD  [employer] General Surgery PGY-2

## 2024-09-09 NOTE — ANESTHESIA POSTPROCEDURE EVALUATION
Anesthesia Post Evaluation    Patient: Jeffrey Coulter    Procedure(s) Performed: Procedure(s) (LRB):  EGD, WITH CLOSED BIOPSY (N/A)    Final Anesthesia Type: general      Patient location during evaluation: GI PACU  Patient participation: Yes- Able to Participate  Level of consciousness: awake and alert and awake  Post-procedure vital signs: reviewed and stable  Pain management: adequate    PONV status at discharge: No PONV  Anesthetic complications: no      Respiratory status: unassisted  Hydration status: euvolemic  Follow-up not needed.              Vitals Value Taken Time   /80 09/09/24 0833   Temp 36.8 °C (98.3 °F) 09/09/24 0833   Pulse 69 09/09/24 0833   Resp 17 09/09/24 0833   SpO2 99 % 09/09/24 1011         No case tracking events are documented in the log.      Pain/Nallely Score: No data recorded

## 2024-09-09 NOTE — PROVATION PATIENT INSTRUCTIONS
Discharge Summary/Instructions after an Endoscopic Procedure  Patient Name: Jeffrey Coulter  Patient MRN: [id number]  Patient YOB: 1985  Monday, September 9, 2024  Bina Sparks MD  Dear patient,  As a result of recent federal legislation (The Federal Cures Act), you may   receive lab or pathology results from your procedure in your [facility]   account before your physician is able to contact you. Your physician or   their representative will relay the results to you with their   recommendations at their soonest availability.  Thank you,  RESTRICTIONS:  During your procedure today, you received medications for sedation.  These   medications may affect your judgment, balance and coordination.  Therefore,   for 24 hours, you have the following restrictions:   - DO NOT drive a car, operate machinery, make legal/financial decisions,   sign important papers or drink alcohol.    ACTIVITY:  Today: no heavy lifting, straining or running due to procedural   sedation/anesthesia.  The following day: return to full activity including work.  DIET:  Eat and drink normally unless instructed otherwise.     TREATMENT FOR COMMON SIDE EFFECTS:  - Mild abdominal pain, nausea, belching, bloating or excessive gas:  rest,   eat lightly and use a heating pad.  - Sore Throat: treat with throat lozenges and/or gargle with warm salt   water.  - Because air was used during the procedure, expelling large amounts of air   from your rectum or belching is normal.  - If a bowel prep was taken, you may not have a bowel movement for 1-3 days.    This is normal.  SYMPTOMS TO WATCH FOR AND REPORT TO YOUR PHYSICIAN:  1. Abdominal pain or bloating, other than gas cramps.  2. Chest pain.  3. Back pain.  4. Signs of infection such as: chills or fever occurring within 24 hours   after the procedure.  5. Rectal bleeding, which would show as bright red, maroon, or black stools.   (A tablespoon of blood from the rectum is not serious,  especially if   hemorrhoids are present.)  6. Vomiting.  7. Weakness or dizziness.  GO DIRECTLY TO THE NEAREST EMERGENCY ROOM IF YOU HAVE ANY OF THE FOLLOWING:      Difficulty breathing              Chills and/or fever over 101 F   Persistent vomiting and/or vomiting blood   Severe abdominal pain   Severe chest pain   Black, tarry stools   Bleeding- more than one tablespoon   Any other symptom or condition that you feel may need urgent attention  Your doctor recommends these additional instructions:  If any biopsies were taken, your doctors clinic will contact you in 1 to 2   weeks with any results.  - Patient has a contact number available for emergencies.  The signs and   symptoms of potential delayed complications were discussed with the   patient.  Return to normal activities tomorrow.  Written discharge   instructions were provided to the patient.   - Discharge patient to home.   - Resume previous diet.   - Continue present medications.   - Await pathology results.   - Recommend trial of voquezna.  If not effective, may need reflux testing.  For questions, problems or results please call your physician - Bina Sparks MD at Work:  [phone], Work:  [phone].  [facility] , EMERGENCY ROOM PHONE NUMBER: [phone]  IF A COMPLICATION OR EMERGENCY SITUATION ARISES AND YOU ARE UNABLE TO REACH   YOUR PHYSICIAN - GO DIRECTLY TO THE EMERGENCY ROOM.  Bina Sparks MD  9/9/2024 11:40:49 AM  This report has been verified and signed electronically.  Dear patient,  As a result of recent federal legislation (The Federal Cures Act), you may   receive lab or pathology results from your procedure in your [facility]   account before your physician is able to contact you. Your physician or   their representative will relay the results to you with their   recommendations at their soonest availability.  Thank you,  PROVATION

## 2024-09-10 ENCOUNTER — PATIENT MESSAGE (OUTPATIENT)
Dept: ADMINISTRATIVE | Facility: OTHER | Age: 39
End: 2024-09-10

## 2024-12-16 ENCOUNTER — HOSPITAL ENCOUNTER (EMERGENCY)
Facility: HOSPITAL | Age: 39
Discharge: HOME | End: 2024-12-16
Attending: FAMILY MEDICINE
Payer: COMMERCIAL

## 2024-12-16 VITALS
SYSTOLIC BLOOD PRESSURE: 126 MMHG | OXYGEN SATURATION: 97 % | HEART RATE: 78 BPM | RESPIRATION RATE: 20 BRPM | HEIGHT: 71 IN | WEIGHT: 260 LBS | TEMPERATURE: 96 F | BODY MASS INDEX: 36.4 KG/M2 | DIASTOLIC BLOOD PRESSURE: 82 MMHG

## 2024-12-16 DIAGNOSIS — L03.012 INFECTED NAILBED OF FINGER, LEFT: ICD-10-CM

## 2024-12-16 DIAGNOSIS — S62.667A CLOSED NONDISPLACED FRACTURE OF DISTAL PHALANX OF LEFT LITTLE FINGER, INITIAL ENCOUNTER: Primary | ICD-10-CM

## 2024-12-16 PROCEDURE — 63600175 PHARM REV CODE 636 W HCPCS: Performed by: FAMILY MEDICINE

## 2024-12-16 PROCEDURE — 25000003 PHARM REV CODE 250: Performed by: FAMILY MEDICINE

## 2024-12-16 PROCEDURE — 10060 I&D ABSCESS SIMPLE/SINGLE: CPT

## 2024-12-16 PROCEDURE — 99284 EMERGENCY DEPT VISIT MOD MDM: CPT | Mod: 25

## 2024-12-16 RX ORDER — SULFAMETHOXAZOLE AND TRIMETHOPRIM 800; 160 MG/1; MG/1
1 TABLET ORAL 2 TIMES DAILY
Qty: 20 TABLET | Refills: 0 | Status: SHIPPED | OUTPATIENT
Start: 2024-12-16 | End: 2024-12-26

## 2024-12-16 RX ORDER — IBUPROFEN 400 MG/1
800 TABLET ORAL
Status: COMPLETED | OUTPATIENT
Start: 2024-12-16 | End: 2024-12-16

## 2024-12-16 RX ORDER — BACITRACIN ZINC 500 UNIT/G
1 OINTMENT (GRAM) TOPICAL
Status: DISCONTINUED | OUTPATIENT
Start: 2024-12-16 | End: 2024-12-16

## 2024-12-16 RX ORDER — LIDOCAINE HYDROCHLORIDE 10 MG/ML
5 INJECTION, SOLUTION INFILTRATION; PERINEURAL
Status: COMPLETED | OUTPATIENT
Start: 2024-12-16 | End: 2024-12-16

## 2024-12-16 RX ADMIN — IBUPROFEN 800 MG: 400 TABLET, FILM COATED ORAL at 12:12

## 2024-12-16 RX ADMIN — LIDOCAINE HYDROCHLORIDE 5 ML: 10 INJECTION, SOLUTION INFILTRATION; PERINEURAL at 11:12

## 2024-12-16 NOTE — ED PROVIDER NOTES
Encounter Date: 12/16/2024       History     Chief Complaint   Patient presents with    Hand Pain     Left 5th digit smashed in vehicle door on Friday with increase in pain and pressure today.  Did not take anything for this pain today. Nailbed black.     This patient is a 39-year-old male who states that he was at work and he slammed his pinky finger in his truck door by accident.  It has turned black underneath his nail and this little area of white collection underneath the nail bed.  Patient has said that he slipped off of the stairs going up his truck and when he grabbed the handle to hold on it hurt a mentally so he came to make sure that it was not broken    The history is provided by the patient.     Review of patient's allergies indicates:  No Known Allergies  Past Medical History:   Diagnosis Date    Anxiety     Cervical spondylosis     Mood disorder     Panic disorder (episodic paroxysmal anxiety)     Reactive hypoglycemia     Rectal pain     Spinal stenosis      Past Surgical History:   Procedure Laterality Date    COLONOSCOPY, WITH POLYPECTOMY USING SNARE N/A 10/30/2023    Procedure: COLONOSCOPY, WITH POLYPECTOMY USING SNARE;  Surgeon: Bina Sparks MD;  Location: [facility] ENDOSCOPY;  Service: Gastroenterology;  Laterality: N/A;    CYST REMOVAL      eyebrow    EGD, WITH CLOSED BIOPSY N/A 9/9/2024    Procedure: EGD, WITH CLOSED BIOPSY;  Surgeon: Bina Sparks MD;  Location: [facility] ENDOSCOPY;  Service: Gastroenterology;  Laterality: N/A;     Family History   Problem Relation Name Age of Onset    Leukemia Mother Karlee Cesar     Lung cancer Maternal Grandmother      Parkinsonism Maternal Grandfather      Alzheimer's disease Paternal Grandmother       Social History     Tobacco Use    Smoking status: Never     Passive exposure: Never    Smokeless tobacco: Never   Substance Use Topics    Alcohol use: Not Currently     Comment: socially    Drug use: Never     Review of Systems   Constitutional:  Negative.    HENT: Negative.     Respiratory: Negative.     Cardiovascular: Negative.    Endocrine: Negative.    Musculoskeletal:         Left 5th distal finger pain swelling and dark blood under the nail bed   Neurological: Negative.    Psychiatric/Behavioral: Negative.     All other systems reviewed and are negative.      Physical Exam     Initial Vitals [12/16/24 1110]   BP Pulse Resp Temp SpO2   138/86 75 20 (!) 95.9 °F (35.5 °C) 96 %      MAP       --         Physical Exam    Nursing note and vitals reviewed.  Constitutional: He appears well-developed and well-nourished.   HENT:   Head: Normocephalic.   Eyes: Pupils are equal, round, and reactive to light.   Neck:   Normal range of motion.  Cardiovascular:  Normal rate and regular rhythm.           Pulmonary/Chest: Breath sounds normal.   Abdominal: Abdomen is soft. Bowel sounds are normal.   Musculoskeletal:         General: Normal range of motion.        Hands:       Cervical back: Normal range of motion.      Comments: Old blood underneath the left 5th pinky now.  There is a small area of fluctuance at the base of the nail bed.  Appears to be infection process     Neurological: He is alert and oriented to person, place, and time. He has normal strength. GCS score is 15. GCS eye subscore is 4. GCS verbal subscore is 5. GCS motor subscore is 6.   Skin: Skin is warm and dry. Capillary refill takes less than 2 seconds.   Psychiatric: He has a normal mood and affect.         ED Course   I & D - Incision and Drainage    Date/Time: 12/16/2024 12:41 PM  Location procedure was performed: [facility] EMERGENCY DEPARTMENT    Performed by: Alisa Austin MD  Authorized by: Alisa Austin MD  Assisting provider: Alisa Austin MD  Pre-operative diagnosis: Paronychia  Post-operative diagnosis: .  Paronychia  Consent Done: Emergent Situation  Type: abscess  Anesthesia: local infiltration    Anesthesia:  Local Anesthetic: lidocaine 1% without  epinephrine  Scalpel size: 11  Incision type: single straight  Incision depth: dermal  Complexity: simple  Drainage: serosanguinous  Drainage amount: scant  Wound treatment: wound left open  Patient tolerance: Patient tolerated the procedure well with no immediate complications    Incision depth: dermal        Labs Reviewed - No data to display       Imaging Results              X-Ray Finger 2 or More Views Left (Final result)  Result time 12/16/24 12:31:46      Final result by Sienna Garcia MD (12/16/24 12:31:46)                   Impression:      Nondisplaced fracture of the distal phalanx.      Electronically signed by: Sienna Garcia  Date:    12/16/2024  Time:    12:31               Narrative:    EXAMINATION:  XR FINGER 2 OR MORE VIEWS LEFT    CLINICAL HISTORY:  trauma;    COMPARISON:  None.    FINDINGS:  There is a nondisplaced fracture of the distal phalanx.  Soft tissue swelling is noted.                                       Medications   LIDOcaine HCL 10 mg/ml (1%) injection 5 mL (5 mLs Infiltration Given 12/16/24 1142)   ibuprofen tablet 800 mg (800 mg Oral Given 12/16/24 1219)     Medical Decision Making  This patient is a, now very painful and there is an accumulation of pus in his nail bed  Differential diagnosis: Paronychia, distal finger fracture    Amount and/or Complexity of Data Reviewed  Radiology: ordered.     Details: X-ray shows that the patient has a nondisplaced fracture of the distal phalynx    Risk  Prescription drug management.                                      Clinical Impression:  Final diagnoses:  [S62.356P] Closed nondisplaced fracture of distal phalanx of left little finger, initial encounter (Primary)  [L03.012] Infected nailbed of finger, left          ED Disposition Condition    Discharge Stable          ED Prescriptions       Medication Sig Dispense Start Date End Date Auth. Provider    sulfamethoxazole-trimethoprim 800-160mg (BACTRIM DS) 800-160 mg Tab Take 1 tablet  by mouth 2 (two) times daily. for 10 days 20 tablet 12/16/2024 12/26/2024 Alisa Austin MD          Follow-up Information       Follow up With Specialties Details Why Contact Zach Johnson NP Family Medicine Schedule an appointment as soon as possible for a visit in 3 days  [address] [place] [address] [address] [address]  [phone]               Alisa Austin MD  12/16/24 124

## 2024-12-16 NOTE — Clinical Note
"Jeffrey Coulter (Daniel) was seen and treated in our emergency department on 12/16/2024.  He may return to work on 12/16/2024.       If you have any questions or concerns, please don't hesitate to call.      Rosi Bokoer RN    "

## 2025-03-24 ENCOUNTER — HOSPITAL ENCOUNTER (EMERGENCY)
Facility: HOSPITAL | Age: 40
Discharge: HOME | End: 2025-03-24
Attending: FAMILY MEDICINE
Payer: COMMERCIAL

## 2025-03-24 VITALS
OXYGEN SATURATION: 97 % | BODY MASS INDEX: 38.51 KG/M2 | HEIGHT: 69 IN | RESPIRATION RATE: 20 BRPM | WEIGHT: 260 LBS | SYSTOLIC BLOOD PRESSURE: 145 MMHG | HEART RATE: 80 BPM | TEMPERATURE: 98 F | DIASTOLIC BLOOD PRESSURE: 80 MMHG

## 2025-03-24 DIAGNOSIS — M51.360 DEGENERATION OF INTERVERTEBRAL DISC OF LUMBAR REGION WITH DISCOGENIC BACK PAIN: Primary | ICD-10-CM

## 2025-03-24 DIAGNOSIS — M43.16 ANTEROLISTHESIS OF LUMBAR SPINE: ICD-10-CM

## 2025-03-24 LAB
ALBUMIN SERPL-MCNC: 4.1 G/DL (ref 3.5–5)
ALBUMIN/GLOB SERPL: 1.1 RATIO (ref 1.1–2)
ALP SERPL-CCNC: 119 UNIT/L (ref 40–150)
ALT SERPL-CCNC: 22 UNIT/L (ref 0–55)
ANION GAP SERPL CALC-SCNC: 7 MEQ/L
AST SERPL-CCNC: 14 UNIT/L (ref 11–45)
BASOPHILS # BLD AUTO: 0.04 X10(3)/MCL
BASOPHILS NFR BLD AUTO: 0.4 %
BILIRUB SERPL-MCNC: 0.3 MG/DL
BILIRUB UR QL STRIP.AUTO: NEGATIVE
BUN SERPL-MCNC: 21 MG/DL (ref 8.9–20.6)
CALCIUM SERPL-MCNC: 9 MG/DL (ref 8.4–10.2)
CHLORIDE SERPL-SCNC: 106 MMOL/L (ref 98–107)
CLARITY UR: CLEAR
CO2 SERPL-SCNC: 27 MMOL/L (ref 22–29)
COLOR UR AUTO: YELLOW
CREAT SERPL-MCNC: 1.1 MG/DL (ref 0.72–1.25)
CREAT/UREA NIT SERPL: 19
EOSINOPHIL # BLD AUTO: 0.08 X10(3)/MCL (ref 0–0.9)
EOSINOPHIL NFR BLD AUTO: 0.7 %
ERYTHROCYTE [DISTWIDTH] IN BLOOD BY AUTOMATED COUNT: 12.5 % (ref 11.5–17)
EST. AVERAGE GLUCOSE BLD GHB EST-MCNC: 111.2 MG/DL
GFR SERPLBLD CREATININE-BSD FMLA CKD-EPI: >60 ML/MIN/1.73/M2
GLOBULIN SER-MCNC: 3.8 GM/DL (ref 2.4–3.5)
GLUCOSE SERPL-MCNC: 100 MG/DL (ref 74–100)
GLUCOSE UR QL STRIP: NEGATIVE
HBA1C MFR BLD: 5.5 %
HCT VFR BLD AUTO: 44.1 % (ref 42–52)
HGB BLD-MCNC: 14.6 G/DL (ref 14–18)
HGB UR QL STRIP: NEGATIVE
IMM GRANULOCYTES # BLD AUTO: 0.04 X10(3)/MCL (ref 0–0.04)
IMM GRANULOCYTES NFR BLD AUTO: 0.4 %
KETONES UR QL STRIP: NEGATIVE
LEUKOCYTE ESTERASE UR QL STRIP: NEGATIVE
LYMPHOCYTES # BLD AUTO: 1.73 X10(3)/MCL (ref 0.6–4.6)
LYMPHOCYTES NFR BLD AUTO: 16 %
MCH RBC QN AUTO: 29 PG (ref 27–31)
MCHC RBC AUTO-ENTMCNC: 33.1 G/DL (ref 33–36)
MCV RBC AUTO: 87.7 FL (ref 80–94)
MONOCYTES # BLD AUTO: 0.62 X10(3)/MCL (ref 0.1–1.3)
MONOCYTES NFR BLD AUTO: 5.8 %
NEUTROPHILS # BLD AUTO: 8.27 X10(3)/MCL (ref 2.1–9.2)
NEUTROPHILS NFR BLD AUTO: 76.7 %
NITRITE UR QL STRIP: NEGATIVE
NRBC BLD AUTO-RTO: 0 %
PH UR STRIP: 6 [PH]
PLATELET # BLD AUTO: 299 X10(3)/MCL (ref 130–400)
PMV BLD AUTO: 10.1 FL (ref 7.4–10.4)
POTASSIUM SERPL-SCNC: 3.9 MMOL/L (ref 3.5–5.1)
PROT SERPL-MCNC: 7.9 GM/DL (ref 6.4–8.3)
PROT UR QL STRIP: NEGATIVE
RBC # BLD AUTO: 5.03 X10(6)/MCL (ref 4.7–6.1)
SODIUM SERPL-SCNC: 140 MMOL/L (ref 136–145)
SP GR UR STRIP.AUTO: >=1.03 (ref 1–1.03)
UROBILINOGEN UR STRIP-ACNC: 0.2
WBC # BLD AUTO: 10.78 X10(3)/MCL (ref 4.5–11.5)

## 2025-03-24 PROCEDURE — 81003 URINALYSIS AUTO W/O SCOPE: CPT | Performed by: FAMILY MEDICINE

## 2025-03-24 PROCEDURE — 83036 HEMOGLOBIN GLYCOSYLATED A1C: CPT | Performed by: FAMILY MEDICINE

## 2025-03-24 PROCEDURE — 80053 COMPREHEN METABOLIC PANEL: CPT | Performed by: FAMILY MEDICINE

## 2025-03-24 PROCEDURE — 85025 COMPLETE CBC W/AUTO DIFF WBC: CPT | Performed by: FAMILY MEDICINE

## 2025-03-24 PROCEDURE — 96372 THER/PROPH/DIAG INJ SC/IM: CPT | Performed by: FAMILY MEDICINE

## 2025-03-24 PROCEDURE — 99284 EMERGENCY DEPT VISIT MOD MDM: CPT | Mod: 25

## 2025-03-24 PROCEDURE — 63600175 PHARM REV CODE 636 W HCPCS: Performed by: FAMILY MEDICINE

## 2025-03-24 RX ORDER — KETOROLAC TROMETHAMINE 10 MG/1
10 TABLET, FILM COATED ORAL EVERY 6 HOURS
Qty: 20 TABLET | Refills: 0 | Status: SHIPPED | OUTPATIENT
Start: 2025-03-24 | End: 2025-03-29

## 2025-03-24 RX ORDER — DEXAMETHASONE SODIUM PHOSPHATE 4 MG/ML
8 INJECTION, SOLUTION INTRA-ARTICULAR; INTRALESIONAL; INTRAMUSCULAR; INTRAVENOUS; SOFT TISSUE
Status: COMPLETED | OUTPATIENT
Start: 2025-03-24 | End: 2025-03-24

## 2025-03-24 RX ORDER — KETOROLAC TROMETHAMINE 30 MG/ML
60 INJECTION, SOLUTION INTRAMUSCULAR; INTRAVENOUS
Status: COMPLETED | OUTPATIENT
Start: 2025-03-24 | End: 2025-03-24

## 2025-03-24 RX ADMIN — DEXAMETHASONE SODIUM PHOSPHATE 8 MG: 4 INJECTION, SOLUTION INTRA-ARTICULAR; INTRALESIONAL; INTRAMUSCULAR; INTRAVENOUS; SOFT TISSUE at 06:03

## 2025-03-24 RX ADMIN — KETOROLAC TROMETHAMINE 60 MG: 30 INJECTION, SOLUTION INTRAMUSCULAR at 06:03

## 2025-03-24 NOTE — ED NOTES
Patient ambulated to ER room 4 with steady gait.  States that yesterday he started having difficulty urinating.  He has urgency and hesitancy.  Bladder scanner showed 75ml in bladder.  Notified Dr. Bergman. Who verbalized understanding.

## 2025-03-24 NOTE — ED PROVIDER NOTES
Encounter Date: 3/24/2025       History     Chief Complaint   Patient presents with    Dysuria     Pain to urination with urgency, incomplete urination, and urinary retention. LOP 10/0     This patient is a 39-year-old male who comes in with complaint of urgency, incomplete urination and urinary retention.  Patient states that he has a history of kidney stones, however I have looked back at several CAT scans that have been done for kidney stones and he never has a stone.  Today, we will check his urine to see if there is hematuria.  Major complaint is lower back pain now    The history is provided by the patient.     Review of patient's allergies indicates:  No Known Allergies  Past Medical History:   Diagnosis Date    Anxiety     Cervical spondylosis     Mood disorder     Panic disorder (episodic paroxysmal anxiety)     Reactive hypoglycemia     Rectal pain     Spinal stenosis      Past Surgical History:   Procedure Laterality Date    COLONOSCOPY, WITH POLYPECTOMY USING SNARE N/A 10/30/2023    Procedure: COLONOSCOPY, WITH POLYPECTOMY USING SNARE;  Surgeon: Bina Sparks MD;  Location: [facility] ENDOSCOPY;  Service: Gastroenterology;  Laterality: N/A;    CYST REMOVAL      eyebrow    EGD, WITH CLOSED BIOPSY N/A 9/9/2024    Procedure: EGD, WITH CLOSED BIOPSY;  Surgeon: Bina Sparks MD;  Location: [facility] ENDOSCOPY;  Service: Gastroenterology;  Laterality: N/A;     Family History   Problem Relation Name Age of Onset    Leukemia Mother Karlee Cesar     Lung cancer Maternal Grandmother      Parkinsonism Maternal Grandfather      Alzheimer's disease Paternal Grandmother       Social History[1]  Review of Systems   Constitutional: Negative.    HENT: Negative.     Respiratory: Negative.     Cardiovascular: Negative.    Endocrine: Negative.    Genitourinary:  Positive for difficulty urinating.   Musculoskeletal:  Positive for back pain.   Neurological: Negative.    Psychiatric/Behavioral: Negative.     All other  systems reviewed and are negative.      Physical Exam     Initial Vitals [03/24/25 1603]   BP Pulse Resp Temp SpO2   (!) 159/95 89 20 97.2 °F (36.2 °C) 97 %      MAP       --         Physical Exam    Nursing note and vitals reviewed.  Constitutional: He appears well-developed and well-nourished.   HENT:   Head: Normocephalic.   Eyes: Pupils are equal, round, and reactive to light.   Neck:   Normal range of motion.  Cardiovascular:  Normal rate and regular rhythm.           Pulmonary/Chest: Breath sounds normal.   Abdominal: Abdomen is soft. Bowel sounds are normal.   Musculoskeletal:      Cervical back: Normal range of motion.      Lumbar back: Tenderness present. Decreased range of motion.        Back:       Comments: Patient feels that his back pain is more kidney related, I feel that is more musculoskeletal as he has a history of degenerative disease of his lower back     Neurological: He is alert and oriented to person, place, and time.   Skin: Skin is warm and dry. Capillary refill takes less than 2 seconds.   Psychiatric: He has a normal mood and affect.         ED Course   Procedures  Labs Reviewed   COMPREHENSIVE METABOLIC PANEL - Abnormal       Result Value    Sodium 140      Potassium 3.9      Chloride 106      CO2 27      Glucose 100      Blood Urea Nitrogen 21.0 (*)     Creatinine 1.10      Calcium 9.0      Protein Total 7.9      Albumin 4.1      Globulin 3.8 (*)     Albumin/Globulin Ratio 1.1      Bilirubin Total 0.3            ALT 22      AST 14      eGFR >60      Anion Gap 7.0      BUN/Creatinine Ratio 19     URINALYSIS, REFLEX TO URINE CULTURE - Normal    Color, UA Yellow      Appearance, UA Clear      Specific Gravity, UA >=1.030      pH, UA 6.0      Protein, UA Negative      Glucose, UA Negative      Ketones, UA Negative      Blood, UA Negative      Bilirubin, UA Negative      Urobilinogen, UA 0.2      Nitrites, UA Negative      Leukocyte Esterase, UA Negative     CBC W/ AUTO DIFFERENTIAL     Narrative:     The following orders were created for panel order CBC auto differential.  Procedure                               Abnormality         Status                     ---------                               -----------         ------                     CBC with Differential[[id number]]                           Final result                 Please view results for these tests on the individual orders.   HEMOGLOBIN A1C    Hemoglobin A1c 5.5      Estimated Average Glucose 111.2     CBC WITH DIFFERENTIAL    WBC 10.78      RBC 5.03      Hgb 14.6      Hct 44.1      MCV 87.7      MCH 29.0      MCHC 33.1      RDW 12.5      Platelet 299      MPV 10.1      Neut % 76.7      Lymph % 16.0      Mono % 5.8      Eos % 0.7      Basophil % 0.4      Imm Grans % 0.4      Neut # 8.27      Lymph # 1.73      Mono # 0.62      Eos # 0.08      Baso # 0.04      Imm Gran # 0.04      NRBC% 0.0            Imaging Results    None          Medications   dexAMETHasone injection 8 mg (8 mg Intramuscular Given 3/24/25 1807)   ketorolac injection 60 mg (60 mg Intramuscular Given 3/24/25 1807)     Medical Decision Making  This patient is a 39-year-old male who comes in with initially difficulty urinating but it is come to find out that he only has about 75 cc of urine.  We gave him water and eventually he was able to give us a urine.  I told him that I would not do a CT scan if there was no blood in his urine because the probability of him having a kidney stone was very low.  I explained to him the dangers of having repeated CT scan and the cancer risk.  Differential diagnosis: Urinary tract infection, kidney stone, low back pain, degenerative disease of the lumbar    Amount and/or Complexity of Data Reviewed  Labs: ordered.     Details: Labs done on this patient show that he has a a normal urinalysis, CMP shows a BUN of 21, hemoglobin A1c was 5.5, CBC is normal    Risk  Prescription drug management.                                       Clinical Impression:  Final diagnoses:  [M43.16] Anterolisthesis of lumbar spine  [M51.360] Degeneration of intervertebral disc of lumbar region with discogenic back pain (Primary)          ED Disposition Condition    Discharge Stable          ED Prescriptions       Medication Sig Dispense Start Date End Date Auth. Provider    ketorolac (TORADOL) 10 mg tablet Take 1 tablet (10 mg total) by mouth every 6 (six) hours. for 5 days 20 tablet 3/24/2025 3/29/2025 Alisa Austin MD          Follow-up Information       Follow up With Specialties Details Why Contact Info    Zach Pinzon NP Family Medicine Schedule an appointment as soon as possible for a visit in 2 days  [address] [place] [address] [address] [phone]  [phone]                 [1]   Social History  Tobacco Use    Smoking status: Never     Passive exposure: Never    Smokeless tobacco: Never   Substance Use Topics    Alcohol use: Not Currently     Comment: socially    Drug use: Never        Alisa Austin MD  03/24/25 4454

## (undated) DEVICE — MANIFOLD 4 PORT

## (undated) DEVICE — KIT SURGICAL COLON .25 1.1OZ

## (undated) DEVICE — MOUTHPIECE ENDO 60FR

## (undated) DEVICE — FORCEP ALLIGATOR 2.8MM W/NDL

## (undated) DEVICE — TIP SUCTION YANKAUER

## (undated) DEVICE — SNARE EXACTO COLD